# Patient Record
Sex: FEMALE | Race: WHITE | Employment: OTHER | URBAN - METROPOLITAN AREA
[De-identification: names, ages, dates, MRNs, and addresses within clinical notes are randomized per-mention and may not be internally consistent; named-entity substitution may affect disease eponyms.]

---

## 2017-09-14 ENCOUNTER — DOCUMENTATION ONLY (OUTPATIENT)
Dept: LAB | Facility: CLINIC | Age: 67
End: 2017-09-14

## 2017-09-14 DIAGNOSIS — E78.5 HYPERLIPIDEMIA WITH TARGET LDL LESS THAN 160: Primary | ICD-10-CM

## 2017-09-14 DIAGNOSIS — Z13.1 SCREENING FOR DIABETES MELLITUS: ICD-10-CM

## 2017-09-14 NOTE — PROGRESS NOTES
This patient has a future lab only appointment on 10/12/2017 and needs orders. Please sign the pended labs taken from the overdue  and make any needed changes. Thanks chela

## 2017-10-12 DIAGNOSIS — E78.5 HYPERLIPIDEMIA WITH TARGET LDL LESS THAN 160: ICD-10-CM

## 2017-10-12 DIAGNOSIS — Z13.1 SCREENING FOR DIABETES MELLITUS: ICD-10-CM

## 2017-10-12 LAB
CHOLEST SERPL-MCNC: 163 MG/DL
GLUCOSE SERPL-MCNC: 87 MG/DL (ref 70–99)
HDLC SERPL-MCNC: 69 MG/DL
LDLC SERPL CALC-MCNC: 75 MG/DL
NONHDLC SERPL-MCNC: 94 MG/DL
TRIGL SERPL-MCNC: 93 MG/DL

## 2017-10-12 PROCEDURE — 82947 ASSAY GLUCOSE BLOOD QUANT: CPT | Performed by: FAMILY MEDICINE

## 2017-10-12 PROCEDURE — 80061 LIPID PANEL: CPT | Performed by: FAMILY MEDICINE

## 2017-10-12 PROCEDURE — 36415 COLL VENOUS BLD VENIPUNCTURE: CPT | Performed by: FAMILY MEDICINE

## 2017-10-16 ENCOUNTER — OFFICE VISIT (OUTPATIENT)
Dept: FAMILY MEDICINE | Facility: CLINIC | Age: 67
End: 2017-10-16
Payer: COMMERCIAL

## 2017-10-16 ENCOUNTER — RADIANT APPOINTMENT (OUTPATIENT)
Dept: GENERAL RADIOLOGY | Facility: CLINIC | Age: 67
End: 2017-10-16
Attending: FAMILY MEDICINE
Payer: COMMERCIAL

## 2017-10-16 VITALS
DIASTOLIC BLOOD PRESSURE: 66 MMHG | TEMPERATURE: 97 F | SYSTOLIC BLOOD PRESSURE: 109 MMHG | HEIGHT: 65 IN | BODY MASS INDEX: 22.37 KG/M2 | WEIGHT: 134.25 LBS | HEART RATE: 92 BPM

## 2017-10-16 DIAGNOSIS — E78.5 HYPERLIPIDEMIA WITH TARGET LDL LESS THAN 160: ICD-10-CM

## 2017-10-16 DIAGNOSIS — Z12.31 VISIT FOR SCREENING MAMMOGRAM: ICD-10-CM

## 2017-10-16 DIAGNOSIS — M25.541 METACARPOPHALANGEAL JOINT PAIN OF RIGHT HAND: ICD-10-CM

## 2017-10-16 DIAGNOSIS — Z23 NEED FOR PROPHYLACTIC VACCINATION AND INOCULATION AGAINST INFLUENZA: ICD-10-CM

## 2017-10-16 DIAGNOSIS — Z00.00 ENCOUNTER FOR PREVENTATIVE ADULT HEALTH CARE EXAMINATION: Primary | ICD-10-CM

## 2017-10-16 DIAGNOSIS — Z23 ENCOUNTER FOR IMMUNIZATION: ICD-10-CM

## 2017-10-16 PROCEDURE — 99213 OFFICE O/P EST LOW 20 MIN: CPT | Mod: 25 | Performed by: FAMILY MEDICINE

## 2017-10-16 PROCEDURE — G0009 ADMIN PNEUMOCOCCAL VACCINE: HCPCS | Performed by: FAMILY MEDICINE

## 2017-10-16 PROCEDURE — 99397 PER PM REEVAL EST PAT 65+ YR: CPT | Mod: 25 | Performed by: FAMILY MEDICINE

## 2017-10-16 PROCEDURE — G0008 ADMIN INFLUENZA VIRUS VAC: HCPCS | Performed by: FAMILY MEDICINE

## 2017-10-16 PROCEDURE — 90670 PCV13 VACCINE IM: CPT | Performed by: FAMILY MEDICINE

## 2017-10-16 PROCEDURE — 73130 X-RAY EXAM OF HAND: CPT | Mod: RT

## 2017-10-16 PROCEDURE — 90662 IIV NO PRSV INCREASED AG IM: CPT | Performed by: FAMILY MEDICINE

## 2017-10-16 RX ORDER — ATORVASTATIN CALCIUM 40 MG/1
40 TABLET, FILM COATED ORAL DAILY
Qty: 90 TABLET | Refills: 3 | Status: SHIPPED | OUTPATIENT
Start: 2017-10-16 | End: 2018-10-17

## 2017-10-16 NOTE — PROGRESS NOTES
SUBJECTIVE:   CC: Herminia Nowak is an 67 year old woman who presents for preventive health visit.     Physical   Annual:     Getting at least 3 servings of Calcium per day::  Yes    Bi-annual eye exam::  Yes    Dental care twice a year::  Yes    Sleep apnea or symptoms of sleep apnea::  Daytime drowsiness    Diet::  Regular (no restrictions)    Frequency of exercise::  4-5 days/week    Taking medications regularly::  Yes    Medication side effects::  None    Additional concerns today::  YES      Sister-diagnosed with breast cancer 2017.  Negative for BRCA gene.    Father  last year.  No further palpitations since her dad .    Right thumb pain and stiffness.  Harder to open jars.          Today's PHQ-2 Score:   PHQ-2 (  [employer]) 10/12/2017   Q1: Little interest or pleasure in doing things 0   Q2: Feeling down, depressed or hopeless 0   PHQ-2 Score 0   Q1: Little interest or pleasure in doing things Not at all   Q2: Feeling down, depressed or hopeless Not at all   PHQ-2 Score 0       Abuse: Current or Past(Physical, Sexual or Emotional)- No  Do you feel safe in your environment - Yes    Social History   Substance Use Topics     Smoking status: Never Smoker     Smokeless tobacco: Never Used     Alcohol use Yes      Comment: Occasional glass of wine     The patient does not drink >3 drinks per day nor >7 drinks per week.    Reviewed orders with patient.  Reviewed health maintenance and updated orders accordingly - Yes  Labs reviewed in [facility]    Patient over age 50, mutual decision to screen reflected in health maintenance.      Pertinent mammograms are reviewed under the imaging tab.  History of abnormal Pap smear: NO - age 67 - see link Cervical Cytology Screening Guidelines      Reviewed and updated as needed this visit by clinical staff  Tobacco  Allergies  Med Hx  Surg Hx  Fam Hx  Soc Hx      Reviewed and updated as needed this visit by Provider        Past Medical History:   Diagnosis Date      "Constipation      Cyst of breast     pt unsure which breast was over 20 years ago     Hemorrhoid      Hyperlipidemia LDL goal < 160      Non-rheumatic mitral regurgitation 10/31/2016     WBC decreased 1/3/2014      Past Surgical History:   Procedure Laterality Date     COLONOSCOPY  2015?    Normal     GYN SURGERY  Approx 1989    Laparoscopy. All ok     PUNC/ASPIR BREAST CYST      pt unsure which breast over 20 years ago     SURGICAL HISTORY OF -       laparoscopy       ROS:  C: NEGATIVE for fever, chills, change in weight  I: NEGATIVE for worrisome rashes, moles or lesions  E: NEGATIVE for vision changes or irritation  ENT: NEGATIVE for ear, mouth and throat problems  R: NEGATIVE for significant cough or SOB  B: NEGATIVE for masses, tenderness or discharge  CV: NEGATIVE for chest pain, palpitations or peripheral edema  GI: NEGATIVE for nausea, abdominal pain, heartburn, or change in bowel habits  : NEGATIVE for unusual urinary or vaginal symptoms. No vaginal bleeding.  M: NEGATIVE for significant arthralgias or myalgia  N: NEGATIVE for weakness, dizziness or paresthesias  P: NEGATIVE for changes in mood or affect      OBJECTIVE:   /66  Pulse 92  Temp 97  F (36.1  C) (Oral)  Ht 5' 5\" (1.651 m)  Wt 134 lb 4 oz (60.9 kg)  BMI 22.34 kg/m2  EXAM:  GENERAL: healthy, alert and no distress  EYES: Eyes grossly normal to inspection, PERRL and conjunctivae and sclerae normal  HENT: ear canals and TM's normal, nose and mouth without ulcers or lesions  NECK: no adenopathy, no asymmetry, masses, or scars and thyroid normal to palpation  RESP: lungs clear to auscultation - no rales, rhonchi or wheezes  BREAST: normal without masses, tenderness or nipple discharge and no palpable axillary masses or adenopathy  CV: regular rate and rhythm, normal S1 S2, no S3 or S4, no murmur, click or rub, no peripheral edema and peripheral pulses strong  ABDOMEN: soft, nontender, no hepatosplenomegaly, no masses and bowel sounds " normal  MS: pain at right 1st MCP joint with mild swelling. No erythema.  no gross musculoskeletal defects noted, no edema  SKIN: no suspicious lesions or rashes  NEURO: Normal strength and tone, mentation intact and speech normal  PSYCH: mentation appears normal, affect normal/bright    ASSESSMENT/PLAN:   1. Encounter for preventative adult health care examination      2. Hyperlipidemia with target LDL less than 160  Chronic, stable, well controlled, continue current medication, refill done if needed    - atorvastatin (LIPITOR) 40 MG tablet; Take 1 tablet (40 mg) by mouth daily  Dispense: 90 tablet; Refill: 3    3. Metacarpophalangeal joint pain of right hand  Consider hand therapy  - XR Hand Right G/E 3 Views; Future  - [facility] PT, HAND, AND CHIROPRACTIC REFERRAL    4. Need for prophylactic vaccination and inoculation against influenza    - FLU VACCINE, INCREASED ANTIGEN, PRESV FREE, AGE 65+ [48993]  - Vaccine Administration, Initial [79963]    5. Encounter for immunization    - PNEUMOCOCCAL CONJ VACCINE 13 VALENT IM    6. Visit for screening mammogram    - *MA Screening Digital Bilateral; Future    COUNSELING:  Reviewed preventive health counseling, as reflected in patient instructions       Regular exercise       Healthy diet/nutrition       Vision screening       Hearing screening       Immunizations    Vaccinated for: Influenza and Pneumococcal           Aspirin Prophylaxsis       Osteoporosis Prevention/Bone Health       Colon cancer screening       (Jacque)menopause management       The 10-year ASCVD risk score ([place] ROSALINO Jr, et al., 2013) is: 3.3%    Values used to calculate the score:      Age: 67 years      Sex: Female      Is Non- : No      Diabetic: No      Tobacco smoker: No      Systolic Blood Pressure: 109 mmHg      Is BP treated: No      HDL Cholesterol: 69 mg/dL      Total Cholesterol: 163 mg/dL       Advance Care Planning           reports that she has never smoked. She has never  "used smokeless tobacco.    Estimated body mass index is 22.34 kg/(m^2) as calculated from the following:    Height as of this encounter: 5' 5\" (1.651 m).    Weight as of this encounter: 134 lb 4 oz (60.9 kg).   Weight management plan: Discussed healthy diet and exercise guidelines and patient will follow up in 12 months in clinic to re-evaluate.    Counseling Resources:  ATP IV Guidelines  Pooled Cohorts Equation Calculator  Breast Cancer Risk Calculator  FRAX Risk Assessment  ICSI Preventive Guidelines  Dietary Guidelines for Americans, 2010  USDA's MyPlate  ASA Prophylaxis  Lung CA Screening    Dee Mccormack MD  [facility]  Injectable Influenza Immunization Documentation    1.  Is the person to be vaccinated sick today?   No    2. Does the person to be vaccinated have an allergy to a component   of the vaccine?   No    3. Has the person to be vaccinated ever had a serious reaction   to influenza vaccine in the past?   No    4. Has the person to be vaccinated ever had Guillain-Barré syndrome?   No    Form completed by self          "

## 2017-10-16 NOTE — NURSING NOTE
Prior to injection verified patient identity using patient's name and date of birth.    Screening Questionnaire for Adult Immunization    Are you sick today?   No   Do you have allergies to medications, food, a vaccine component or latex?   Yes   Have you ever had a serious reaction after receiving a vaccination?   No   Do you have a long-term health problem with heart disease, lung disease, asthma, kidney disease, metabolic disease (e.g. diabetes), anemia, or other blood disorder?   No   Do you have cancer, leukemia, HIV/AIDS, or any other immune system problem?   No   In the past 3 months, have you taken medications that affect  your immune system, such as prednisone, other steroids, or anticancer drugs; drugs for the treatment of rheumatoid arthritis, Crohn s disease, or psoriasis; or have you had radiation treatments?   No   Have you had a seizure, or a brain or other nervous system problem?   No   During the past year, have you received a transfusion of blood or blood     products, or been given immune (gamma) globulin or antiviral drug?   No   For women: Are you pregnant or is there a chance you could become        pregnant during the next month?   No   Have you received any vaccinations in the past 4 weeks?   No     Immunization questionnaire was positive for at least one answer.  Notified Dr. Mccormack.        Per orders of Dr. Mccormack, injection of PCV 13 and flu given by Marilee Angeles. Patient instructed to remain in clinic for 15 minutes afterwards, and to report any adverse reaction to me immediately.       Screening performed by Marilee Angeles on 10/16/2017 at 2:00 PM.

## 2017-10-16 NOTE — MR AVS SNAPSHOT
After Visit Summary   10/16/2017    Herminia Nowak    MRN: [id number]           Patient Information     Date Of Birth          1950        Visit Information        Provider Department      10/16/2017 1:00 PM Dee Mccormack MD [facility]        Today's Diagnoses     Encounter for preventative adult health care examination    -  1    Hyperlipidemia with target LDL less than 160        Metacarpophalangeal joint pain of right hand        Need for prophylactic vaccination and inoculation against influenza        Encounter for immunization        Visit for screening mammogram          Care Instructions      Preventive Health Recommendations  Female Ages 65 +    Yearly exam:     See your health care provider every year in order to  o Review health changes.   o Discuss preventive care.    o Review your medicines if your doctor has prescribed any.      You no longer need a yearly Pap test unless you've had an abnormal Pap test in the past 10 years. If you have vaginal symptoms, such as bleeding or discharge, be sure to talk with your provider about a Pap test.      Every 1 to 2 years, have a mammogram.  If you are over 69, talk with your health care provider about whether or not you want to continue having screening mammograms.      Every 10 years, have a colonoscopy. Or, have a yearly FIT test (stool test). These exams will check for colon cancer.       Have a cholesterol test every 5 years, or more often if your doctor advises it.       Have a diabetes test (fasting glucose) every three years. If you are at risk for diabetes, you should have this test more often.       At age 65, have a bone density scan (DEXA) to check for osteoporosis (brittle bone disease).    Shots:    Get a flu shot each year.    Get a tetanus shot every 10 years.    Talk to your doctor about your pneumonia vaccines. There are now two you should receive - Pneumovax (PPSV 23) and Prevnar (PCV 13).    Talk to  your doctor about the shingles vaccine.    Talk to your doctor about the hepatitis B vaccine.    Nutrition:     Eat at least 5 servings of fruits and vegetables each day.      Eat whole-grain bread, whole-wheat pasta and brown rice instead of white grains and rice.      Talk to your provider about Calcium and Vitamin D.     Lifestyle    Exercise at least 150 minutes a week (30 minutes a day, 5 days a week). This will help you control your weight and prevent disease.      Limit alcohol to one drink per day.      No smoking.       Wear sunscreen to prevent skin cancer.       See your dentist twice a year for an exam and cleaning.      See your eye doctor every 1 to 2 years to screen for conditions such as glaucoma, macular degeneration, cataracts, etc           Follow-ups after your visit        Additional Services     [facility] PT, HAND, AND CHIROPRACTIC REFERRAL       **This order will print in the [facility] Scheduling Office**    Physical Therapy, Hand Therapy and Chiropractic Care are available through:    *[facility]  *[facility]  *[facility]    Call one number to schedule at any of the above locations: [phone].    Your provider has referred you to: Hand Therapy    Indication/Reason for Referral: right 1st MCP joint pain  Onset of Illness: 6 months  Therapy Orders: Evaluate and Treat  Special Programs: None  Special Request: None    Flor Ray      Additional Comments for the Therapist or Chiropractor:     Please be aware that coverage of these services is subject to the terms and limitations of your health insurance plan.  Call member services at your health plan with any benefit or coverage questions.      Please bring the following to your appointment:    *Your personal calendar for scheduling future appointments  *Comfortable clothing                  Future tests that were ordered for you today     Open Future Orders        Priority Expected Expires  "Ordered    XR Hand Right G/E 3 Views Routine 10/16/2017 10/16/2018 10/16/2017    *MA Screening Digital Bilateral Routine  10/16/2018 10/16/2017            Who to contact     If you have questions or need follow up information about today's clinic visit or your schedule please contact [facility] directly at [phone].  Normal or non-critical lab and imaging results will be communicated to you by [employer], letter or phone within 4 business days after the clinic has received the results. If you do not hear from us within 7 days, please contact the clinic through [employer] or phone. If you have a critical or abnormal lab result, we will notify you by phone as soon as possible.  Submit refill requests through [employer] or call your pharmacy and they will forward the refill request to us. Please allow 3 business days for your refill to be completed.          Additional Information About Your Visit        [employer] Information     [employer] gives you secure access to your electronic health record. If you see a primary care provider, you can also send messages to your care team and make appointments. If you have questions, please call your primary care clinic.  If you do not have a primary care provider, please call [phone] and they will assist you.        Care EveryWhere ID     This is your Care EveryWhere ID. This could be used by other organizations to access your [facility] medical records  [id number]        Your Vitals Were     Pulse Temperature Height BMI (Body Mass Index)          92 97  F (36.1  C) (Oral) 5' 5\" (1.651 m) 22.34 kg/m2         Blood Pressure from Last 3 Encounters:   10/16/17 109/66   10/05/16 108/68   08/31/15 104/70    Weight from Last 3 Encounters:   10/16/17 134 lb 4 oz (60.9 kg)   10/05/16 132 lb (59.9 kg)   08/31/15 132 lb 12.8 oz (60.2 kg)              We Performed the Following     FLU VACCINE, INCREASED ANTIGEN, PRESV FREE, AGE 65+ [47817]     [facility] PT, HAND, AND CHIROPRACTIC " REFERRAL     PNEUMOCOCCAL CONJ VACCINE 13 VALENT IM     Vaccine Administration, Initial [42977]          Where to get your medicines      These medications were sent to [facility] [address] [place] [address] [address] [facility]  [address], [facility] [address]     Phone:  [phone]     atorvastatin 40 MG tablet          Primary Care Provider Office Phone # Fax #    Dee Mccormack [phone]       [address] [facility] [address]        Equal Access to Services     HELLEN MA : Hadii aad ku hadasho Soomaali, waaxda luqadaha, qaybta kaalmada adeegyada, waxay idiin hayaan adeeg kharakizzy harris . So [facility] [phone].    ATENCIÓN: Si habla español, tiene a tamez disposición servicios gratuitos de asistencia lingüística. Llame al [phone].    We comply with applicable federal civil rights laws and [place] laws. We do not discriminate on the basis of race, color, national origin, age, disability, sex, sexual orientation, or gender identity.            Thank you!     Thank you for choosing [facility]  for your care. Our goal is always to provide you with excellent care. Hearing back from our patients is one way we can continue to improve our services. Please take a few minutes to complete the written survey that you may receive in the mail after your visit with us. Thank you!             Your Updated Medication List - Protect others around you: Learn how to safely use, store and throw away your medicines at www.disposemymeds.org.          This list is accurate as of: 10/16/17  1:51 PM.  Always use your most recent med list.                   Brand Name Dispense Instructions for use Diagnosis    aspirin 81 MG tablet      Take  by mouth daily. 1 tablet at night        atorvastatin 40 MG tablet    LIPITOR    90 tablet    Take 1 tablet (40 mg) by mouth daily    Hyperlipidemia with target LDL less than 160       CALCIUM 500 PO      Take  by mouth.         Garlic 1000 MG Caps      Take  by mouth.        ibuprofen 200 MG tablet    ADVIL/MOTRIN     Take 200 mg by mouth. As needed        magnesium 250 MG tablet      Take 1 tablet by mouth daily.        MULTIVITAMIN PO      Take  by mouth.        STOOL SOFTENER PO      Take  by mouth.        vitamin D 2000 UNITS tablet      Take 1 tablet by mouth daily.

## 2017-10-16 NOTE — NURSING NOTE
"Chief Complaint   Patient presents with     Physical       Initial /66  Pulse 92  Temp 97  F (36.1  C) (Oral)  Ht 5' 5\" (1.651 m)  Wt 134 lb 4 oz (60.9 kg)  BMI 22.34 kg/m2 Estimated body mass index is 22.34 kg/(m^2) as calculated from the following:    Height as of this encounter: 5' 5\" (1.651 m).    Weight as of this encounter: 134 lb 4 oz (60.9 kg).  Medication Reconciliation: complete   Margaret Walker CMA      "

## 2017-10-26 ENCOUNTER — RADIANT APPOINTMENT (OUTPATIENT)
Dept: MAMMOGRAPHY | Facility: CLINIC | Age: 67
End: 2017-10-26
Attending: FAMILY MEDICINE

## 2017-10-26 DIAGNOSIS — Z12.31 VISIT FOR SCREENING MAMMOGRAM: ICD-10-CM

## 2017-10-30 ENCOUNTER — TRANSFERRED RECORDS (OUTPATIENT)
Dept: HEALTH INFORMATION MANAGEMENT | Facility: CLINIC | Age: 67
End: 2017-10-30

## 2017-10-30 ENCOUNTER — THERAPY VISIT (OUTPATIENT)
Dept: OCCUPATIONAL THERAPY | Facility: CLINIC | Age: 67
End: 2017-10-30
Payer: MEDICARE

## 2017-10-30 DIAGNOSIS — M79.644 THUMB PAIN, RIGHT: Primary | ICD-10-CM

## 2017-10-30 DIAGNOSIS — M18.11 ARTHRITIS OF CARPOMETACARPAL (CMC) JOINT OF RIGHT THUMB: ICD-10-CM

## 2017-10-30 PROCEDURE — 97535 SELF CARE MNGMENT TRAINING: CPT | Mod: GO | Performed by: OCCUPATIONAL THERAPIST

## 2017-10-30 PROCEDURE — 97110 THERAPEUTIC EXERCISES: CPT | Mod: GO | Performed by: OCCUPATIONAL THERAPIST

## 2017-10-30 PROCEDURE — 97165 OT EVAL LOW COMPLEX 30 MIN: CPT | Mod: GO | Performed by: OCCUPATIONAL THERAPIST

## 2017-10-30 PROCEDURE — G8984 CARRY CURRENT STATUS: HCPCS | Mod: GO | Performed by: OCCUPATIONAL THERAPIST

## 2017-10-30 PROCEDURE — G8985 CARRY GOAL STATUS: HCPCS | Mod: GO | Performed by: OCCUPATIONAL THERAPIST

## 2017-10-30 NOTE — MR AVS SNAPSHOT
After Visit Summary   10/30/2017    Herminia Nowak    MRN: [id number]           Patient Information     Date Of Birth          1950        Visit Information        Provider Department      10/30/2017 1:00 PM Mariaa Castro [facility] [facility]        Today's Diagnoses     Thumb pain, right    -  1    Arthritis of carpometacarpal (CMC) joint of right thumb           Follow-ups after your visit        Who to contact     If you have questions or need follow up information about today's clinic visit or your schedule please contact [facility] directly at [phone].  Normal or non-critical lab and imaging results will be communicated to you by [employer], letter or phone within 4 business days after the clinic has received the results. If you do not hear from us within 7 days, please contact the clinic through [employer] or phone. If you have a critical or abnormal lab result, we will notify you by phone as soon as possible.  Submit refill requests through [employer] or call your pharmacy and they will forward the refill request to us. Please allow 3 business days for your refill to be completed.          Additional Information About Your Visit        MyChart Information     [employer] gives you secure access to your electronic health record. If you see a primary care provider, you can also send messages to your care team and make appointments. If you have questions, please call your primary care clinic.  If you do not have a primary care provider, please call [phone] and they will assist you.        Care EveryWhere ID     This is your Care EveryWhere ID. This could be used by other organizations to access your [place] medical records  [id number]         Blood Pressure from Last 3 Encounters:   10/16/17 109/66   10/05/16 108/68   08/31/15 104/70    Weight from Last 3 Encounters:   10/16/17 60.9 kg (134 lb 4 oz)   10/05/16 59.9 kg  (132 lb)   08/31/15 60.2 kg (132 lb 12.8 oz)              We Performed the Following     HC OT EVAL, LOW COMPLEXITY     [facility] CERT REPORT     [facility] INITIAL EVAL REPORT     SELF CARE MNGMENT TRAINING     THERAPEUTIC EXERCISES        Primary Care Provider Office Phone # Fax #    Dee Mccormack [phone]       [address] [facility] [address]        Equal Access to Services     NITISH [facility] Hospital[facility] : Hadii aad ku hadasho Soomaali, waaxda luqadaha, qaybta kaalmada adeegyada, waxay idiin hayaan adeeg kharash la'aan ah. So [facility] [place] [phone].    ATENCIÓN: Si kathleen buchanan, tiene a tamez disposición servicios gratuitos de asistencia lingüística. Llame al [phone].    We comply with applicable federal civil rights laws and [place] laws. We do not discriminate on the basis of race, color, national origin, age, disability, sex, sexual orientation, or gender identity.            Thank you!     Thank you for choosing [facility]  for your care. Our goal is always to provide you with excellent care. Hearing back from our patients is one way we can continue to improve our services. Please take a few minutes to complete the written survey that you may receive in the mail after your visit with us. Thank you!             Your Updated Medication List - Protect others around you: Learn how to safely use, store and throw away your medicines at www.disposemymeds.org.          This list is accurate as of: 10/30/17  2:17 PM.  Always use your most recent med list.                   Brand Name Dispense Instructions for use Diagnosis    aspirin 81 MG tablet      Take  by mouth daily. 1 tablet at night        atorvastatin 40 MG tablet    LIPITOR    90 tablet    Take 1 tablet (40 mg) by mouth daily    Hyperlipidemia with target LDL less than 160       CALCIUM 500 PO      Take  by mouth.        Garlic 1000 MG Caps      Take  by mouth.        ibuprofen 200 MG tablet     ADVIL/MOTRIN     Take 200 mg by mouth. As needed        magnesium 250 MG tablet      Take 1 tablet by mouth daily.        MULTIVITAMIN PO      Take  by mouth.        STOOL SOFTENER PO      Take  by mouth.        vitamin D 2000 UNITS tablet      Take 1 tablet by mouth daily.

## 2017-10-30 NOTE — PROGRESS NOTES
Hand Therapy Initial Evaluation    Current Date:  10/30/2017    Subjective:  Herminia Nowak is a 67 year old left hand dominant female.    Diagnosis:   Right thumb pain  DOI:  10/16/17 (therapy referral)     Patient reports symptoms of pain and weakness/loss of strength of the right thumb which occurred due to gradual onset over the past 6 months. Since onset symptoms are gradually getting worse.  Special tests:  x-ray mild to moderate CMC arthritis.  Previous treatment: None but seen in 2013 for right thumb and wrist pain.  General health as reported by patient is good to excellent.  Pertinent medical history includes:none  Medical allergies: Penicillins.  Surgical history: other: breast biopsy.  Medication history: Cholesterol.    Occupational Profile Information:  Current occupation is Retired  Prior functional level:  independent-shared household chores  Barriers include:none  Mobility: No difficulty  Transportation: drives    Functional Outcome Measure:  Upper Extremity Functional Index  SCORE:   Column Totals: 74/80  (A lower score indicates greater disability.)    ROM:  Thumb  10/30/17   AROM(PROM) Left Right   PAbd 45 45   RAbd 50 40     Strength:   (Measured in pounds)    10/30/17   Trials Left Right   1 53 53-     Lat Pinch  10/30/17   Trials Left Right   1 13 11+ slight     3 Pt Pinch  10/30/17   Trials Left Right   1 13 11+     Tenderness:   10/30/17   CMC joint of Thumb +   FCR -   1st DC -   Thumb web space +     Appearance:   10/30/17   Shoulder deformity at CMC +   Edema over the CMC joint -   Noted collapse of MP into hyperextension during pinch -     Special Tests:   10/30/17   Grind -   Passive Retroposition ++   Finkelsteins -     Pain Report:  VAS(0-10) 10/30/17   At Rest: 0/10   With Use: 3/10   Location:  thumb  Pain Quality:  Dull aching  Frequency: intermittent    Pain is worst:  daytime  Exacerbated by:  Gripping, lifting  Relieved by:  rest  Progression:  Gradually  worsening  Assessment:  Patient presents with symptoms consistent with diagnosis of CMC joint pain of thumb, with conservative intervention.    Patient s limitations or Problem List includes: Pain, Decreased ROM/motion, weakness, decreased stability of the CMC joint, decreased  and pinch strength of the thumb which interferes with patients ability to perform  Self Care Tasks (eating), Work Tasks and Household Chores as compared to previous level of function.    Patient will benefit from skilled Occupational Therapy to increase ROM, flexibility, pinch strength, and stability of the thumb and decrease pain to return to previous activity level and resume normal daily tasks and to reach their rehab potential.    Rehab Potential:  Good - Return to full activity, some limitations    Barriers to Learning:  No barrier    Communication Issues:  Patient appears to be able to clearly communicate and understand verbal and written communication and follow directions correctly.    Chart Review: Chart Review and Simple history review with patient    Identified Performance Deficits: home establishment and management and meal preparation and cleanup    Assessment of Occupational Performance:  3-5 Performance Deficits    Clinical Decision Making (Complexity): Low complexity    Treatment Explanation:  The following has been discussed with the patient:  RX ordered/plan of care  Anticipated outcomes  Possible risks and side effects    Plan:  Frequency:  2 X a month, once daily  Duration:  for 2 months    Treatment Plan:  Modalities:  Paraffin  Therapeutic Exercise: AROM, Isometrics, and Stabilization exercises of the Thumb CMC, including active and resisted abduction, 1st DI strengthening  Manual Techniques: Joint Mobilization or reseating of the trapezium, self MFR to thumb adductor with clip or small ball  Orthotic Fabrication:  Hand based Thumb Spica orthosis, Custom neoprene orthosis  Education: Anatomy of CMC, joint protection  principles, adaptive equipment as needed    Discharge Plan:  Achieve all LTG  [place] in home treatment program.  Reach maximal therapeutic benefit.    Home Program:  Warmth  1st web release with clip or small ball  Self CMC mobilization on chest  Place and hold pinch; avoid MP and IP hyperext  Thumb Stabilization Program with hard  C  and index abd  Incorporate joint protection into daily functional activities    Next Visit:  See in 2 weeks  Consider trial of Paraffin  Assess response to HEP  Consider CMC neoprene cool comfort orthosis or orthoplast thumb spica orthosis   Issue resources for adaptive equipment

## 2017-10-30 NOTE — LETTER
DEPARTMENT OF HEALTH AND HUMAN SERVICES  CENTERS FOR MEDICARE & MEDICAID SERVICES    PLAN/UPDATED PLAN OF PROGRESS FOR OUTPATIENT REHABILITATION    PATIENTS NAME:  Herminia Nowak YOB: 1950  PROVIDER NUMBER:  [id number]  [id number]  PROVIDER NAME: [facility] [place]  MEDICAL RECORD NUMBER: [id number]   START OF CARE DATE:    SOC Date: 10/30/17   TYPE:  OT  PRIMARY/TREATMENT DIAGNOSIS: (Pertinent Medical Diagnosis)  Thumb pain, right  Arthritis of carpometacarpal (CMC) joint of right thumb  VISITS FROM START OF CARE:  Rxs Used: 1     Hand Therapy Initial Evaluation  Current Date:  10/30/2017    Subjective:  Herminia Nowak is a 67 year old left hand dominant female.  Diagnosis:   Right thumb pain  DOI:  10/16/17 (therapy referral)   Patient reports symptoms of pain and weakness/loss of strength of the right thumb which occurred due to gradual onset over the past 6 months. Since onset symptoms are gradually getting worse.  Special tests:  x-ray mild to moderate CMC arthritis.  Previous treatment: None but seen in  for right thumb and wrist pain.  General health as reported by patient is good to excellent.  Pertinent medical history includes:none  Medical allergies: Penicillins.  Surgical history: other: breast biopsy.  Medication history: Cholesterol.    Occupational Profile Information:  Current occupation is Retired  Prior functional level:  independent-shared household chores  Barriers include:none  Mobility: No difficulty  Transportation: drives    Functional Outcome Measure:  Upper Extremity Functional Index  SCORE:   Column Totals: 74/80  (A lower score indicates greater disability.)    ROM:  Thumb  10/30/17   AROM(PROM) Left Right   PAbd 45 45   RAbd 50 40     Strength:   (Measured in pounds)    10/30/17   Trials Left Right   1 53 53-   PATIENTS NAME:  Herminia Nowak   : 1950  Lat Pinch  10/30/17   Trials Left Right   1 13 11+ slight     3 Pt Pinch   10/30/17   Trials Left Right   1 13 11+     Tenderness:   10/30/17   CMC joint of Thumb +   FCR -   1st DC -   Thumb web space +     Appearance:   10/30/17   Shoulder deformity at CMC +   Edema over the CMC joint -   Noted collapse of MP into hyperextension during pinch -     Special Tests:   10/30/17   Grind -   Passive Retroposition ++   Finkelsteins -     Pain Report:  VAS(0-10) 10/30/17   At Rest: 0/10   With Use: 3/10   Location:  thumb  Pain Quality:  Dull aching  Frequency: intermittent    Pain is worst:  daytime  Exacerbated by:  Gripping, lifting  Relieved by:  rest  Progression:  Gradually worsening  Assessment:  Patient presents with symptoms consistent with diagnosis of CMC joint pain of thumb, with conservative intervention.  Patient s limitations or Problem List includes: Pain, Decreased ROM/motion, weakness, decreased stability of the CMC joint, decreased  and pinch strength of the thumb which interferes with patients ability to perform  Self Care Tasks (eating), Work Tasks and Household Chores as compared to previous level of function.  Patient will benefit from skilled Occupational Therapy to increase ROM, flexibility, pinch strength, and stability of the thumb and decrease pain to return to previous activity level and resume normal daily tasks and to reach their rehab potential.  Rehab Potential:  Good - Return to full activity, some limitations  Barriers to Learning:  No barrier    PATIENTS NAME:  Herminia Nowak   : 1950  Communication Issues:  Patient appears to be able to clearly communicate and understand verbal and written communication and follow directions correctly.  Chart Review: Chart Review and Simple history review with patient  Identified Performance Deficits: home establishment and management and meal preparation and cleanup    Assessment of Occupational Performance:  3-5 Performance Deficits  Clinical Decision Making (Complexity): Low complexity  Treatment Explanation:  The  "following has been discussed with the patient:  RX ordered/plan of care  Anticipated outcomes  Possible risks and side effects  Plan:  Frequency:  2 X a month, once daily  Duration:  for 2 months  Treatment Plan:  Modalities:  Paraffin  Therapeutic Exercise: AROM, Isometrics, and Stabilization exercises of the Thumb CMC, including active and resisted abduction, 1st DI strengthening  Manual Techniques: Joint Mobilization or reseating of the trapezium, self MFR to thumb adductor with clip or small ball  Orthotic Fabrication:  Hand based Thumb Spica orthosis, Custom neoprene orthosis  Education: Anatomy of CMC, joint protection principles, adaptive equipment as needed  Discharge Plan:  Achieve all LTG  [place] in home treatment program.  Reach maximal therapeutic benefit.  Home Program:  Warmth  1st web release with clip or small ball  Self CMC mobilization on chest  Place and hold pinch; avoid MP and IP hyperext  Thumb Stabilization Program with hard  C  and index abd  Incorporate joint protection into daily functional activities  Next Visit:  See in 2 weeks  Consider trial of Paraffin  Assess response to HEP  Consider CMC neoprene cool comfort orthosis or orthoplast thumb spica orthosis   Issue resources for adaptive equipment        Caregiver Signature/Credentials ______________________________ Date ________       Treating Provider: Mariaa Castro, MARCUSR/L, CHT    I have reviewed and certified the need for these services and plan of treatment while under my care.        PHYSICIAN'S SIGNATURE:   _________________________________________  Date___________    Dee Mccormack  Certification period: Beginning of Cert date period: 10/30/17 End of Cert period date: 18   PATIENTS NAME:  Herminia Nowak   : 1950  Functional Level Progress Report: Please see attached \"Goal Flow sheet for Functional level.\"    ___X_____ Continue Services or       ________ DC Services                Service dates: SOC Date: 10/30/17 "  to present

## 2017-11-03 ENCOUNTER — TELEPHONE (OUTPATIENT)
Dept: FAMILY MEDICINE | Facility: CLINIC | Age: 67
End: 2017-11-03

## 2017-11-03 NOTE — TELEPHONE ENCOUNTER
Forms received from [facility]/ Updated plan of progress for Hand Therapy Initial Evaluation (current date 10/30/2017)  for Dee Mccormack MD.  Forms placed in provider 'sign me' folder.  Please fax forms to [phone] after completion.    Key Schwarz  Patient Representative

## 2018-01-04 PROBLEM — M79.644 THUMB PAIN, RIGHT: Status: RESOLVED | Noted: 2017-10-30 | Resolved: 2018-01-04

## 2018-01-04 PROBLEM — M18.11 ARTHRITIS OF CARPOMETACARPAL (CMC) JOINT OF RIGHT THUMB: Status: RESOLVED | Noted: 2017-10-30 | Resolved: 2018-01-04

## 2018-07-27 ENCOUNTER — TELEPHONE (OUTPATIENT)
Dept: FAMILY MEDICINE | Facility: CLINIC | Age: 68
End: 2018-07-27

## 2018-07-27 DIAGNOSIS — E78.00 HIGH BLOOD CHOLESTEROL: Primary | ICD-10-CM

## 2018-07-27 NOTE — TELEPHONE ENCOUNTER
Reason for Call:  Other     Detailed comments: Can you please place orders for lab appointment scheduled in October     Phone Number Patient can be reached at: Home number on file [phone] (home)    Best Time:     Can we leave a detailed message on this number? Not Applicable    Call taken on 7/27/2018 at 11:08 AM by Suzette Rodrigues

## 2018-07-27 NOTE — TELEPHONE ENCOUNTER
Ordered lipids per HM. Reviewed last year's labs and OV note. Called patient and she confirms and is willing to have another draw if new provider wants to order anything new. She denies symptoms or concerns with glucose.   Deann Hayden RN

## 2018-10-10 DIAGNOSIS — E78.00 HIGH BLOOD CHOLESTEROL: ICD-10-CM

## 2018-10-10 LAB
CHOLEST SERPL-MCNC: 161 MG/DL
HDLC SERPL-MCNC: 54 MG/DL
LDLC SERPL CALC-MCNC: 86 MG/DL
NONHDLC SERPL-MCNC: 107 MG/DL
TRIGL SERPL-MCNC: 107 MG/DL

## 2018-10-10 PROCEDURE — 80061 LIPID PANEL: CPT | Performed by: NURSE PRACTITIONER

## 2018-10-10 PROCEDURE — 36415 COLL VENOUS BLD VENIPUNCTURE: CPT | Performed by: NURSE PRACTITIONER

## 2018-10-14 ASSESSMENT — ENCOUNTER SYMPTOMS
SORE THROAT: 0
WEAKNESS: 0
BREAST MASS: 0
COUGH: 1
DYSURIA: 0
MYALGIAS: 0
HEMATOCHEZIA: 0
NERVOUS/ANXIOUS: 1
PALPITATIONS: 0
NAUSEA: 0
ABDOMINAL PAIN: 0
CHILLS: 0
DIZZINESS: 0
JOINT SWELLING: 0
FREQUENCY: 0
EYE PAIN: 0
SHORTNESS OF BREATH: 0
CONSTIPATION: 1
ARTHRALGIAS: 1
HEARTBURN: 0
DIARRHEA: 0
HEMATURIA: 0
PARESTHESIAS: 0
FEVER: 0
HEADACHES: 0

## 2018-10-14 ASSESSMENT — ACTIVITIES OF DAILY LIVING (ADL)
CURRENT_FUNCTION: NO ASSISTANCE NEEDED
I_NEED_ASSISTANCE_FOR_THE_FOLLOWING_DAILY_ACTIVITIES:: NO ASSISTANCE IS NEEDED

## 2018-10-17 ENCOUNTER — OFFICE VISIT (OUTPATIENT)
Dept: FAMILY MEDICINE | Facility: CLINIC | Age: 68
End: 2018-10-17
Payer: COMMERCIAL

## 2018-10-17 VITALS
HEART RATE: 66 BPM | DIASTOLIC BLOOD PRESSURE: 68 MMHG | HEIGHT: 65 IN | WEIGHT: 134 LBS | SYSTOLIC BLOOD PRESSURE: 108 MMHG | TEMPERATURE: 98.8 F | BODY MASS INDEX: 22.33 KG/M2

## 2018-10-17 DIAGNOSIS — Z12.31 VISIT FOR SCREENING MAMMOGRAM: ICD-10-CM

## 2018-10-17 DIAGNOSIS — N90.89 LESION OF VULVA: ICD-10-CM

## 2018-10-17 DIAGNOSIS — Z85.828 HISTORY OF SKIN CANCER: ICD-10-CM

## 2018-10-17 DIAGNOSIS — Z12.4 SCREENING FOR MALIGNANT NEOPLASM OF CERVIX: ICD-10-CM

## 2018-10-17 DIAGNOSIS — Z00.00 ROUTINE HISTORY AND PHYSICAL EXAMINATION OF ADULT: Primary | ICD-10-CM

## 2018-10-17 DIAGNOSIS — Z23 NEED FOR PROPHYLACTIC VACCINATION WITH TETANUS-DIPHTHERIA (TD): ICD-10-CM

## 2018-10-17 DIAGNOSIS — E78.5 HYPERLIPIDEMIA WITH TARGET LDL LESS THAN 160: ICD-10-CM

## 2018-10-17 PROCEDURE — G0476 HPV COMBO ASSAY CA SCREEN: HCPCS | Performed by: NURSE PRACTITIONER

## 2018-10-17 PROCEDURE — G0009 ADMIN PNEUMOCOCCAL VACCINE: HCPCS | Performed by: NURSE PRACTITIONER

## 2018-10-17 PROCEDURE — G0145 SCR C/V CYTO,THINLAYER,RESCR: HCPCS | Performed by: NURSE PRACTITIONER

## 2018-10-17 PROCEDURE — 90714 TD VACC NO PRESV 7 YRS+ IM: CPT | Performed by: NURSE PRACTITIONER

## 2018-10-17 PROCEDURE — 90732 PPSV23 VACC 2 YRS+ SUBQ/IM: CPT | Performed by: NURSE PRACTITIONER

## 2018-10-17 PROCEDURE — 90472 IMMUNIZATION ADMIN EACH ADD: CPT | Performed by: NURSE PRACTITIONER

## 2018-10-17 PROCEDURE — 99397 PER PM REEVAL EST PAT 65+ YR: CPT | Mod: 25 | Performed by: NURSE PRACTITIONER

## 2018-10-17 RX ORDER — ATORVASTATIN CALCIUM 40 MG/1
40 TABLET, FILM COATED ORAL DAILY
Qty: 90 TABLET | Refills: 3 | Status: SHIPPED | OUTPATIENT
Start: 2018-10-17 | End: 2019-10-18

## 2018-10-17 ASSESSMENT — ENCOUNTER SYMPTOMS
COUGH: 1
JOINT SWELLING: 0
HEADACHES: 0
MYALGIAS: 0
DYSURIA: 0
NERVOUS/ANXIOUS: 1
FEVER: 0
SHORTNESS OF BREATH: 0
HEARTBURN: 0
SORE THROAT: 0
CONSTIPATION: 1
PARESTHESIAS: 0
NAUSEA: 0
WEAKNESS: 0
HEMATOCHEZIA: 0
CHILLS: 0
HEMATURIA: 0
FREQUENCY: 0
DIARRHEA: 0
PALPITATIONS: 0
ARTHRALGIAS: 1
EYE PAIN: 0
ABDOMINAL PAIN: 0
DIZZINESS: 0
BREAST MASS: 0

## 2018-10-17 ASSESSMENT — ACTIVITIES OF DAILY LIVING (ADL): CURRENT_FUNCTION: NO ASSISTANCE NEEDED

## 2018-10-17 NOTE — MR AVS SNAPSHOT
After Visit Summary   10/17/2018    Herminia Nowak    MRN: [id number]           Patient Information     Date Of Birth          1950        Visit Information        Provider Department      10/17/2018 2:40 PM Jennifer Dalton NP [facility]        Today's Diagnoses     Routine history and physical examination of adult    -  1    Visit for screening mammogram        Need for prophylactic vaccination with tetanus-diphtheria (Td)        Hyperlipidemia with target LDL less than 160        Screening for malignant neoplasm of cervix        Lesion of vulva          Care Instructions      Preventive Health Recommendations    Female Ages 65 +    Yearly exam:     See your health care provider every year in order to  o Review health changes.   o Discuss preventive care.    o Review your medicines if your doctor has prescribed any.      You no longer need a yearly Pap test unless you've had an abnormal Pap test in the past 10 years. If you have vaginal symptoms, such as bleeding or discharge, be sure to talk with your provider about a Pap test.      Every 1 to 2 years, have a mammogram.  If you are over 69, talk with your health care provider about whether or not you want to continue having screening mammograms.      Every 10 years, have a colonoscopy. Or, have a yearly FIT test (stool test). These exams will check for colon cancer.       Have a cholesterol test every 5 years, or more often if your doctor advises it.       Have a diabetes test (fasting glucose) every three years. If you are at risk for diabetes, you should have this test more often.       At age 65, have a bone density scan (DEXA) to check for osteoporosis (brittle bone disease).    Shots:    Get a flu shot each year.    Get a tetanus shot every 10 years.    Talk to your doctor about your pneumonia vaccines. There are now two you should receive - Pneumovax (PPSV 23) and Prevnar (PCV 13).    Talk to your pharmacist about the  shingles vaccine.    Talk to your doctor about the hepatitis B vaccine.    Nutrition:     Eat at least 5 servings of fruits and vegetables each day.      Eat whole-grain bread, whole-wheat pasta and brown rice instead of white grains and rice.      Get adequate Calcium and Vitamin D.     Lifestyle    Exercise at least 150 minutes a week (30 minutes a day, 5 days a week). This will help you control your weight and prevent disease.      Limit alcohol to one drink per day.      No smoking.       Wear sunscreen to prevent skin cancer.       See your dentist twice a year for an exam and cleaning.      See your eye doctor every 1 to 2 years to screen for conditions such as glaucoma, macular degeneration and cataracts.  [facility]   Discharged by : Emily Black CMA  If you have any questions regarding your visit please contact your care team:     Team Gold                Clinic Hours Telephone Number     Dr. Kae Dalton, CNP  Linda Phelan, CNP 7am-7pm  Monday - Thursday   7am-5pm  Fridays  [phone]   (Appointment scheduling available 24/7)     RN Line  [phone] option 2     Urgent Care - [place] and [place] - 11am-9pm Monday-Friday Saturday-Sunday- 9am-5pm     [place] -   5pm-9pm Monday-Friday Saturday-Sunday- 9am-5pm    [phone] - [place]    [phone] - [place]       For a Price Quote for your services, please call our Consumer Price Line at [phone].     What options do I have for visits at the clinic other than the traditional office visit?     To expand how we care for you, many of our providers are utilizing electronic visits (e-visits) and telephone visits, when medically appropriate, for interactions with their patients rather than a visit in the clinic. We also offer nurse visits for many medical concerns. Just like any other service, we will bill your insurance company for  this type of visit based on time spent on the phone with your provider. Not all insurance companies cover these visits. Please check with your medical insurance if this type of visit is covered. You will be responsible for any charges that are not paid by your insurance.   E-visits via In2Gameshart: generally incur a $35.00 fee.     Telephone visits:  Time spent on the phone: *charged based on time that is spent on the phone in increments of 10 minutes. Estimated cost:   5-10 mins $30.00   11-20 mins. $59.00   21-30 mins. $85.00       Use musiXmatch (secure email communication and access to your chart) to send your primary care provider a message or make an appointment. Ask someone on your Team how to sign up for musiXmatch.     As always, Thank you for trusting us with your health care needs!      [facility] [place] [facility]:    Scheduling Appointments  Patricia Chin [facility]  Call: [phone]    [facility]  Call: [phone]    [facility]  Call: [phone]    For Gastroenterology referrals   [facility]   Clinics and Surgery Center, 4th Floor   [address] [place] [address]   Appointments: [phone]    WHERE TO GO FOR CARE?  Clinic    Make an appointment if you:     Are sick (cold, cough, flu, sore throat, earache or in pain).     Have a small injury (sprain, small cut, burn or broken bone).     Need a physical exam, Pap smear, vaccine or prescription refill.     Have questions about your health or medicines.    To reach us:    Call [phone] ([phone]). Open 24 hours every day. (For counseling services, call [phone].)  Log into musiXmatch at [email or web address]. (Visit [email or web address] to create an account.) Hospital emergency room    An emergency is a serious or life- threatening problem that must be treated right away.    Call 293 or get to the hospital if you have:    Very bad or sudden:            -  Chest pain or pressure         - Bleeding         - Head or belly pain         - Dizziness or trouble seeing, walking or                          Speaking    Problems breathing    Blood in your vomit or you are coughing up blood    A major injury (knocked out, loss of a finger or limb, rape, broken bone protruding from skin)  A mental health crisis. (Or call the Mental Health Crisis line at 1-516.756.8596 or Suicide Prevention Hotline at 1-758.445.7805.)    Open 24 hours every day. You don't need an appointment.     Urgent care    Visit urgent care for sickness or small injuries when the clinic is closed. You don't need an appointment. To check hours or find an urgent care near you, visit [email or web address]. Online care    Get online care from [facility] for more than 70 common problems, like colds, allergies and infections. Open 24 hours every day at:   [email or web address]   Need help deciding?    For advice about where to be seen, you may call your clinic and ask to speak with a nurse. We're here for you 24 hours every day.         If you are deaf or hard of hearing, please let us know. We provide many free services including sign language interpreters, oral interpreters, TTYs, telephone amplifiers, note takers and written materials.                       Follow-ups after your visit        Additional Services     OB/GYN REFERRAL       Your provider has referred you to:  [facility] [phone]   [email or web address]    Please be aware that coverage of these services is subject to the terms and limitations of your health insurance plan.  Call member services at your health plan with any benefit or coverage questions.      Please bring the following with you to your appointment:    (1) Any X-Rays, CTs or MRIs which have been performed.  Contact the facility where they were done to arrange for  prior to your scheduled appointment.   (2) List of current  medications   (3) This referral request   (4) Any documents/labs given to you for this referral                  Follow-up notes from your care team     Return in about 1 year (around 10/17/2019) for Physical Exam.      Your next 10 appointments already scheduled     Oct 29, 2018 10:30 AM CDT   MA SCREENING BILATERAL W/ JANUARY with [id number]   [facility] ([facility])    [address] [place] [address], 2nd Floor  [facility] [address] [phone]           How do I prepare for my exam? (Food and drink instructions) No Food and Drink Restrictions.  How do I prepare for my exam? (Other instructions) Do not use any powder, lotion or deodorant under your arms or on your breast. If you do, we will ask you to remove it before your exam.  What should I wear: Wear comfortable, two-piece clothing.  How long does the exam take: Most scans will take 15 minutes.  What should I bring: Bring any previous mammograms from other facilities or have them mailed to the breast center.  Do I need a :  No  is needed.  What do I need to tell my doctor: If you have any allergies, tell your care team.  What should I do after the exam: No restrictions, You may resume normal activities.  What is this test: This test is an x-ray of the breast to look for breast disease. The breast is pressed between two plates to flatten and spread the tissue. An X-ray is taken of the breast from different angles.  Who should I call with questions: If you have any questions, please call the Imaging Department where you will have your exam. Directions, parking instructions, and other information is available on our website, [email or web address].  Other information about my exam Three-dimensional (3D) mammograms are available at [facility] locations in [facility], [facility], [facility], [place], and [place]. [facility] locations include [place] and the [facility] [place] in  "[place].  Benefits of 3D mammograms include: * Improved rate of cancer detection * Decreases your chance of having to go back for more tests, which means fewer: * \"False-positive\" results (This means that there is an abnormal area but it isn't cancer.) * Invasive testing procedures, such as a biopsy or surgery * Can provide clearer images of the breast if you have dense breast tissue.  *3D mammography is an optional exam that anyone can have with a 2D mammogram. It doesn't replace or take the place of a 2D mammogram. 2D mammograms remain an effective screening test for all women.  Not all insurance companies cover the cost of a 3D mammogram. Check with your insurance.              Who to contact     If you have questions or need follow up information about today's clinic visit or your schedule please contact [facility] directly at [phone].  Normal or non-critical lab and imaging results will be communicated to you by MyChart, letter or phone within 4 business days after the clinic has received the results. If you do not hear from us within 7 days, please contact the clinic through Solidagext or phone. If you have a critical or abnormal lab result, we will notify you by phone as soon as possible.  Submit refill requests through backstitch or call your pharmacy and they will forward the refill request to us. Please allow 3 business days for your refill to be completed.          Additional Information About Your Visit        Crowdrallyhart Information     backstitch gives you secure access to your electronic health record. If you see a primary care provider, you can also send messages to your care team and make appointments. If you have questions, please call your primary care clinic.  If you do not have a primary care provider, please call [phone] and they will assist you.        Care EveryWhere ID     This is your Care EveryWhere ID. This could be used by other organizations to access your [place] " "medical records  [id number]        Your Vitals Were     Pulse Temperature Height BMI (Body Mass Index)          66 98.8  F (37.1  C) (Oral) 5' 5\" (1.651 m) 22.3 kg/m2         Blood Pressure from Last 3 Encounters:   10/17/18 108/68   10/16/17 109/66   10/05/16 108/68    Weight from Last 3 Encounters:   10/17/18 134 lb (60.8 kg)   10/16/17 134 lb 4 oz (60.9 kg)   10/05/16 132 lb (59.9 kg)              We Performed the Following     HPV High Risk Types DNA Cervical     OB/GYN REFERRAL     Pap imaged thin layer screen with HPV - recommended age 30 - 65 years (select HPV order below)     PPSV23, IM/SUBQ (2+ YRS) - Gijuenhkj43     TD (ADULT, 7+) PRESERVE FREE          Today's Medication Changes          These changes are accurate as of 10/17/18  3:38 PM.  If you have any questions, ask your nurse or doctor.               Stop taking these medicines if you haven't already. Please contact your care team if you have questions.     vitamin D 2000 units tablet   Stopped by:  Jennifer Dalton NP                Where to get your medicines      These medications were sent to [facility] [address] [place] [address]     Phone:  [phone]     atorvastatin 40 MG tablet                Primary Care Provider Office Phone # Fax #    Jennifer Dalton [phone]       [address] [place] [address]        Equal Access to Services     NITISH MA AH: Hadii aad ku hadasho Soomaali, waaxda luqadaha, qaybta kaalmada adeegyada, alison pugh. So [facility] [phone].    ATENCIÓN: Si habla español, tiene a tamez disposición servicios gratuitos de asistencia lingüística. Llame al [phone].    We comply with applicable federal civil rights laws and [place] laws. We do not discriminate on the basis of race, color, national origin, age, disability, sex, sexual orientation, or gender identity.            Thank you!     " Thank you for choosing [facility]  for your care. Our goal is always to provide you with excellent care. Hearing back from our patients is one way we can continue to improve our services. Please take a few minutes to complete the written survey that you may receive in the mail after your visit with us. Thank you!             Your Updated Medication List - Protect others around you: Learn how to safely use, store and throw away your medicines at www.disposemymeds.org.          This list is accurate as of 10/17/18  3:38 PM.  Always use your most recent med list.                   Brand Name Dispense Instructions for use Diagnosis    aspirin 81 MG tablet      Take  by mouth daily. 1 tablet at night        atorvastatin 40 MG tablet    LIPITOR    90 tablet    Take 1 tablet (40 mg) by mouth daily    Hyperlipidemia with target LDL less than 160       CALCIUM 500 PO      Take  by mouth.        FISH OIL PO           Garlic 1000 MG Caps      Take  by mouth.        GLUCOSAMINE CHONDROITIN ADV PO           ibuprofen 200 MG tablet    ADVIL/MOTRIN     Take 200 mg by mouth. As needed        magnesium 250 MG tablet      Take 1 tablet by mouth daily.        MIRALAX PO      Take by mouth as needed        MULTIVITAMIN PO      Take  by mouth.        STOOL SOFTENER PO      Take  by mouth.

## 2018-10-17 NOTE — PATIENT INSTRUCTIONS
Preventive Health Recommendations    Female Ages 65 +    Yearly exam:     See your health care provider every year in order to  o Review health changes.   o Discuss preventive care.    o Review your medicines if your doctor has prescribed any.      You no longer need a yearly Pap test unless you've had an abnormal Pap test in the past 10 years. If you have vaginal symptoms, such as bleeding or discharge, be sure to talk with your provider about a Pap test.      Every 1 to 2 years, have a mammogram.  If you are over 69, talk with your health care provider about whether or not you want to continue having screening mammograms.      Every 10 years, have a colonoscopy. Or, have a yearly FIT test (stool test). These exams will check for colon cancer.       Have a cholesterol test every 5 years, or more often if your doctor advises it.       Have a diabetes test (fasting glucose) every three years. If you are at risk for diabetes, you should have this test more often.       At age 65, have a bone density scan (DEXA) to check for osteoporosis (brittle bone disease).    Shots:    Get a flu shot each year.    Get a tetanus shot every 10 years.    Talk to your doctor about your pneumonia vaccines. There are now two you should receive - Pneumovax (PPSV 23) and Prevnar (PCV 13).    Talk to your pharmacist about the shingles vaccine.    Talk to your doctor about the hepatitis B vaccine.    Nutrition:     Eat at least 5 servings of fruits and vegetables each day.      Eat whole-grain bread, whole-wheat pasta and brown rice instead of white grains and rice.      Get adequate Calcium and Vitamin D.     Lifestyle    Exercise at least 150 minutes a week (30 minutes a day, 5 days a week). This will help you control your weight and prevent disease.      Limit alcohol to one drink per day.      No smoking.       Wear sunscreen to prevent skin cancer.       See your dentist twice a year for an exam and cleaning.      See your eye doctor  every 1 to 2 years to screen for conditions such as glaucoma, macular degeneration and cataracts.  [facility]   Discharged by : Emily Black CMA  If you have any questions regarding your visit please contact your care team:     Team Gold                Clinic Hours Telephone Number     Dr. Kae Dalton, CNP  Linda Tapan, CNP 7am-7pm  Monday - Thursday   7am-5pm  Fridays  [phone]   (Appointment scheduling available 24/7)     RN Line  [phone] option 2     Urgent Care - [place] - 11am-9pm Monday-Friday Saturday-Sunday- 9am-5pm     [place] -   5pm-9pm Monday-Friday Saturday-Sunday- 9am-5pm    [phone] - [place]    [phone] - [place]       For a Price Quote for your services, please call our Consumer Price Line at [phone].     What options do I have for visits at the clinic other than the traditional office visit?     To expand how we care for you, many of our providers are utilizing electronic visits (e-visits) and telephone visits, when medically appropriate, for interactions with their patients rather than a visit in the clinic. We also offer nurse visits for many medical concerns. Just like any other service, we will bill your insurance company for this type of visit based on time spent on the phone with your provider. Not all insurance companies cover these visits. Please check with your medical insurance if this type of visit is covered. You will be responsible for any charges that are not paid by your insurance.   E-visits via Nopsec: generally incur a $35.00 fee.     Telephone visits:  Time spent on the phone: *charged based on time that is spent on the phone in increments of 10 minutes. Estimated cost:   5-10 mins $30.00   11-20 mins. $59.00   21-30 mins. $85.00       Use Nopsec (secure email communication and access to your chart) to send your primary  care provider a message or make an appointment. Ask someone on your Team how to sign up for Lineagen.     As always, Thank you for trusting us with your health care needs!      [place]:    Scheduling Appointments  [place]  Call: [phone]    [facility]osalia Mejias [facility]  Call: [phone]    [facility]  Call: [phone]    For Gastroenterology referrals   [facility]   Clinics and Surgery [place], 4th Floor   [address] [place] [address]   Appointments: [phone]    WHERE TO GO FOR CARE?  Clinic    Make an appointment if you:     Are sick (cold, cough, flu, sore throat, earache or in pain).     Have a small injury (sprain, small cut, burn or broken bone).     Need a physical exam, Pap smear, vaccine or prescription refill.     Have questions about your health or medicines.    To reach us:    Call [phone] ([phone]). Open 24 hours every day. (For counseling services, call [phone].)  Log into Lineagen at [email or web address]. (Visit [email or web address] to create an account.) Hospital emergency room    An emergency is a serious or life- threatening problem that must be treated right away.    Call 014 or get to the hospital if you have:    Very bad or sudden:            - Chest pain or pressure         - Bleeding         - Head or belly pain         - Dizziness or trouble seeing, walking or                          Speaking    Problems breathing    Blood in your vomit or you are coughing up blood    A major injury (knocked out, loss of a finger or limb, rape, broken bone protruding from skin)  A mental health crisis. (Or call the Mental Health Crisis line at 1-550.613.3733 or Suicide Prevention Hotline at 1-448.768.6654.)    Open 24 hours every day. You don't need an appointment.     Urgent care    Visit urgent care for sickness or small injuries when the clinic is closed. You don't  need an appointment. To check hours or find an urgent care near you, visit [email or web address]. Online care    Get [facility] care from [facility] for more than 70 common problems, like colds, allergies and infections. Open 24 hours every day at:   [email or web address]   Need help deciding?    For advice about where to be seen, you may call your clinic and ask to speak with a nurse. We're here for you 24 hours every day.         If you are deaf or hard of hearing, please let us know. We provide many free services including sign language interpreters, oral interpreters, TTYs, telephone amplifiers, note takers and written materials.

## 2018-10-17 NOTE — PROGRESS NOTES
SUBJECTIVE:   Herminia Nowak is a 68 year old female who presents for Preventive Visit.    Are you in the first 12 months of your Medicare coverage?  No    Patient would like pimple in vaginal area checked.   Has questions about Shingles vaccine, informed her she should  Check with pharmacy for insurance coverage.   Informed patient she is due for next pneumonia vaccine and TDAP, she is undecided on getting today. Getting over a cold.   She was also undecided on Flu Vaccine today.     Constipation:  Needing to use over the counter medications that are effective.  Docusate calcium 5-6 times per week  Generic Miralax 1/3-1/2 dosage daily    Vaginal lesion:  Has a spot she can feel on the right posterior labia.  It is not painful but she is worried about it because she has history of skin cancer.  She first noticed it in the last year.  She has a hard time seeing it when trying to use a mirror.    Physical   Annual:     Getting at least 3 servings of Calcium per day:  NO    Bi-annual eye exam:  Yes    Dental care twice a year:  Yes    Sleep apnea or symptoms of sleep apnea:  None    Diet:  Regular (no restrictions)    Frequency of exercise:  4-5 days/week    Duration of exercise:  30-45 minutes    Taking medications regularly:  Yes    Medication side effects:  None    Additional concerns today:  YES    Ability to successfully perform activities of daily living: no assistance needed    Home Safety:  Throw rugs in the hallway    Hearing Impairment: no hearing concerns     Fall risk:  Fallen 2 or more times in the past year?: No  Any fall with injury in the past year?: No    COGNITIVE SCREEN  1) Repeat 3 items (Leader, Season, Table)    2) Clock draw: NORMAL  3) 3 item recall: Recalls 2 objects  Results: NORMAL clock, 1-2 items recalled: COGNITIVE IMPAIRMENT LESS LIKELY    Mini-CogTM Copyright RICHI Resendez. Licensed by the author for use in [facility]; reprinted with permission (beatriz@.[facility]). All rights reserved.         Reviewed and updated as needed this visit by clinical staff  Tobacco  Allergies  Meds  Problems  Med Hx  Surg Hx  Fam Hx  Soc Hx          Reviewed and updated as needed this visit by Provider  Allergies  Meds  Problems        Social History   Substance Use Topics     Smoking status: Never Smoker     Smokeless tobacco: Never Used     Alcohol use Yes      Comment: Occasional glass of wine       Alcohol Use 10/14/2018   If you drink alcohol do you typically have greater than 3 drinks per day OR greater than 7 drinks per week? No   No flowsheet data found.    Today's PHQ-2 Score:   PHQ-2 ( 1999 [employer]) 10/14/2018   Q1: Little interest or pleasure in doing things 0   Q2: Feeling down, depressed or hopeless 1   PHQ-2 Score 1   Q1: Little interest or pleasure in doing things Not at all   Q2: Feeling down, depressed or hopeless Several days   PHQ-2 Score 1       Do you feel safe in your environment - Yes    Do you have a Health Care Directive?: Yes: Patient states has Advance Directive and will bring in a copy to clinic.    Current providers sharing in care for this patient include:   Patient Care Team:  Jennifer Dalton, NP as PCP - General (Nurse Practitioner - Family)    The following health maintenance items are reviewed in Epic and correct as of today:  Health Maintenance   Topic Date Due     INFLUENZA VACCINE (1) 09/01/2018     PNEUMOCOCCAL (2 of 2 - PPSV23) 10/16/2018     MAMMO Q1 YR  10/26/2018     LIPID MONITORING Q1 YEAR  10/10/2019     FALL RISK ASSESSMENT  10/17/2019     PHQ-2 Q1 YR  10/17/2019     DEXA Q5 YR  10/20/2020     ADVANCE DIRECTIVE PLANNING Q5 YRS  12/16/2021     COLON CANCER SCREEN (SYSTEM ASSIGNED)  06/20/2023     TETANUS IMMUNIZATION (SYSTEM ASSIGNED)  10/17/2028     HEPATITIS C SCREENING  Completed     BP Readings from Last 3 Encounters:   10/17/18 108/68   10/16/17 109/66   10/05/16 108/68    Wt Readings from Last 3 Encounters:   10/17/18 134 lb (60.8 kg)   10/16/17 134 lb 4 oz  (60.9 kg)   10/05/16 132 lb (59.9 kg)                  Patient Active Problem List   Diagnosis     Advance Care Planning     Hyperlipidemia with target LDL less than 160     WBC decreased     Family history of osteoporosis     Non-rheumatic mitral regurgitation     Non-rheumatic tricuspid valve insufficiency     History of skin cancer     Past Surgical History:   Procedure Laterality Date     COLONOSCOPY  2015?    Normal     GYN SURGERY  Approx 1989    Laparoscopy. All ok     PUNC/ASPIR BREAST CYST      pt unsure which breast over 20 years ago     SURGICAL HISTORY OF -       laparoscopy       Social History   Substance Use Topics     Smoking status: Never Smoker     Smokeless tobacco: Never Used     Alcohol use Yes      Comment: Occasional glass of wine     Family History   Problem Relation Age of Onset     Cancer Mother      skin     Cerebrovascular Disease Mother      Several small strokes, also paternal and maternal     Osteoporosis Mother      Osteopenia     Hypertension Father      Paternal aunts/uncles     Hyperlipidemia Father      Paternal aunts and uncles     Osteoporosis Sister      Sister had to give herself shots     Thyroid Disease Sister      Thyroid irradiated- on medication now     Hyperlipidemia Sister      No Known Problems Daughter      Breast Cancer Sister 67     BRCA negative     No Known Problems Daughter      Diabetes Maternal Aunt      Breast Cancer Paternal Aunt      Diabetes Maternal Grandmother      Also materials aunts/uncles     Diabetes Other          Current Outpatient Prescriptions   Medication Sig Dispense Refill     aspirin 81 MG tablet Take  by mouth daily. 1 tablet at night       atorvastatin (LIPITOR) 40 MG tablet Take 1 tablet (40 mg) by mouth daily 90 tablet 3     Calcium Carbonate (CALCIUM 500 PO) Take  by mouth.       Docusate Calcium (STOOL SOFTENER PO) Take  by mouth.       Garlic 1000 MG CAPS Take  by mouth.       ibuprofen (ADVIL,MOTRIN) 200 MG tablet Take 200 mg by  "mouth. As needed       magnesium 250 MG tablet Take 1 tablet by mouth daily.        Misc Natural Products (GLUCOSAMINE CHONDROITIN ADV PO)        Multiple Vitamin (MULTIVITAMIN OR) Take  by mouth.       Omega-3 Fatty Acids (FISH OIL PO)        Polyethylene Glycol 3350 (MIRALAX PO) Take by mouth as needed       [DISCONTINUED] atorvastatin (LIPITOR) 40 MG tablet Take 1 tablet (40 mg) by mouth daily 90 tablet 3     Allergies   Allergen Reactions     Penicillins Rash       Last 3 Pap and HPV Results:   PAP / HPV 8/31/2015 9/24/2012   PAP NIL NIL       Review of Systems   Constitutional: Negative for chills and fever.   HENT: Positive for congestion. Negative for ear pain, hearing loss and sore throat.    Eyes: Positive for visual disturbance. Negative for pain.   Respiratory: Positive for cough. Negative for shortness of breath.    Cardiovascular: Negative for chest pain, palpitations and peripheral edema.   Gastrointestinal: Positive for constipation. Negative for abdominal pain, diarrhea, heartburn, hematochezia and nausea.   Breasts:  Negative for tenderness, breast mass and discharge.   Genitourinary: Positive for genital sores. Negative for dysuria, frequency, hematuria, pelvic pain, urgency, vaginal bleeding and vaginal discharge.   Musculoskeletal: Positive for arthralgias. Negative for joint swelling and myalgias.   Skin: Negative for rash.   Neurological: Negative for dizziness, weakness, headaches and paresthesias.   Psychiatric/Behavioral: Negative for mood changes. The patient is nervous/anxious.        OBJECTIVE:   /68  Pulse 66  Temp 98.8  F (37.1  C) (Oral)  Ht 5' 5\" (1.651 m)  Wt 134 lb (60.8 kg)  BMI 22.3 kg/m2 Estimated body mass index is 22.3 kg/(m^2) as calculated from the following:    Height as of this encounter: 5' 5\" (1.651 m).    Weight as of this encounter: 134 lb (60.8 kg).  Physical Exam  GENERAL: healthy, alert and no distress  EYES: Eyes grossly normal to inspection, PERRL and " conjunctivae and sclerae normal  HENT: ear canals and TM's normal, nose and mouth without ulcers or lesions  NECK: no adenopathy, no asymmetry, masses, or scars and thyroid normal to palpation  RESP: lungs clear to auscultation - no rales, rhonchi or wheezes  BREAST: normal without masses, tenderness or nipple discharge and no palpable axillary masses or adenopathy  CV: regular rate and rhythm, normal S1 S2, no S3 or S4, no murmur, click or rub, no peripheral edema and peripheral pulses strong  ABDOMEN: soft, nontender, no hepatosplenomegaly, no masses and bowel sounds normal   (female): hyperpigmented papule of right inferior vulva.  Vaginal mucosa pink, moist, well rugated, and normal cervix/adnexa/uterus without masses or discharge  MS: no gross musculoskeletal defects noted, no edema  SKIN: no suspicious lesions or rashes  NEURO: Normal strength and tone, mentation intact and speech normal  PSYCH: mentation appears normal, affect normal/bright    Diagnostic Test Results:  Results for orders placed or performed in visit on 10/10/18   Lipid panel reflex to direct LDL Fasting   Result Value Ref Range    Cholesterol 161 <200 mg/dL    Triglycerides 107 <150 mg/dL    HDL Cholesterol 54 >49 mg/dL    LDL Cholesterol Calculated 86 <100 mg/dL    Non HDL Cholesterol 107 <130 mg/dL       ASSESSMENT / PLAN:   1. Routine history and physical examination of adult    - PPSV23, IM/SUBQ (2+ YRS) - [id number]    2. Visit for screening mammogram  - Patient has mammogram scheduled this month.    3. Need for prophylactic vaccination with tetanus-diphtheria (Td)    - TD (ADULT, 7+) PRESERVE FREE    4. Hyperlipidemia with target LDL less than 160  Chronic, stable, continue current treatment.    - atorvastatin (LIPITOR) 40 MG tablet; Take 1 tablet (40 mg) by mouth daily  Dispense: 90 tablet; Refill: 3    5. Screening for malignant neoplasm of cervix  Per guidelines, patient is not due for Pap since she is 68 years old, however  "patient specifically requests Pap today.  She has h/o normal Paps in our record dating back to 2012, so due to lack of a decade of documented normal Paps, doing a Pap today seems reasonable.  - Pap imaged thin layer screen with HPV  - HPV High Risk Types DNA Cervical    6. Lesion of vulva  - Discussed that this appears to be a hyperpigmented skin tag and recommend continuing to monitor.  Patient voices concern given that she has h/o skin cancer (squamous cell), family history of skin cancers (basal cell and squamous cell), and that this is a new finding in the past year.  She is considering going to ob/gyn for a consult and is aware how to schedule.    7. History of skin cancer      End of Life Planning:  Patient currently has an advanced directive: Patient asked to bring in record from home.    COUNSELING:  Reviewed preventive health counseling, as reflected in patient instructions       Regular exercise       Healthy diet/nutrition    BP Readings from Last 1 Encounters:   10/17/18 108/68     Estimated body mass index is 22.3 kg/(m^2) as calculated from the following:    Height as of this encounter: 5' 5\" (1.651 m).    Weight as of this encounter: 134 lb (60.8 kg).           reports that she has never smoked. She has never used smokeless tobacco.      Appropriate preventive services were discussed with this patient, including applicable screening as appropriate for cardiovascular disease, diabetes, osteopenia/osteoporosis, and glaucoma.  As appropriate for age/gender, discussed screening for colorectal cancer, prostate cancer, breast cancer, and cervical cancer. Checklist reviewing preventive services available has been given to the patient.    Reviewed patients plan of care and provided an AVS. The Basic Care Plan (routine screening as documented in Health Maintenance) for Herminia meets the Care Plan requirement. This Care Plan has been established and reviewed with the Patient.    Counseling Resources:  ATP IV " Guidelines  Pooled Cohorts Equation Calculator  Breast Cancer Risk Calculator  FRAX Risk Assessment  ICSI Preventive Guidelines  Dietary Guidelines for Americans, 2010  [employer]'s MyPlate  ASA Prophylaxis  Lung CA Screening    Jennifer Dalton NP  [facility]  Answers for HPI/ROS submitted by the patient on 10/14/2018   PHQ-2 Score: 1creen for

## 2018-10-17 NOTE — NURSING NOTE
Screening Questionnaire for Adult Immunization    Are you sick today?   No   Do you have allergies to medications, food, a vaccine component or latex?   No   Have you ever had a serious reaction after receiving a vaccination?   No   Do you have a long-term health problem with heart disease, lung disease, asthma, kidney disease, metabolic disease (e.g. diabetes), anemia, or other blood disorder?   No   Do you have cancer, leukemia, HIV/AIDS, or any other immune system problem?   No   In the past 3 months, have you taken medications that affect  your immune system, such as prednisone, other steroids, or anticancer drugs; drugs for the treatment of rheumatoid arthritis, Crohn s disease, or psoriasis; or have you had radiation treatments?   No   Have you had a seizure, or a brain or other nervous system problem?   No   During the past year, have you received a transfusion of blood or blood     products, or been given immune (gamma) globulin or antiviral drug?   No   For women: Are you pregnant or is there a chance you could become        pregnant during the next month?   No   Have you received any vaccinations in the past 4 weeks?   No     Immunization questionnaire answers were all negative.        Per orders of  Jennifer RAMIREZ-NP, injection of Td, and PPSV 23 given by Emily Black. Patient instructed to remain in clinic for 15 minutes afterwards, and to report any adverse reaction to me immediately.       Screening performed by Emily Black on 10/17/2018 at 3:53 PM.

## 2018-10-22 LAB
COPATH REPORT: NORMAL
PAP: NORMAL

## 2018-10-23 LAB
FINAL DIAGNOSIS: NORMAL
HPV HR 12 DNA CVX QL NAA+PROBE: NEGATIVE
HPV16 DNA SPEC QL NAA+PROBE: NEGATIVE
HPV18 DNA SPEC QL NAA+PROBE: NEGATIVE
SPECIMEN DESCRIPTION: NORMAL
SPECIMEN SOURCE CVX/VAG CYTO: NORMAL

## 2018-10-29 ENCOUNTER — RADIANT APPOINTMENT (OUTPATIENT)
Dept: MAMMOGRAPHY | Facility: CLINIC | Age: 68
End: 2018-10-29
Attending: NURSE PRACTITIONER
Payer: COMMERCIAL

## 2018-10-29 DIAGNOSIS — Z12.31 VISIT FOR SCREENING MAMMOGRAM: ICD-10-CM

## 2018-11-16 ENCOUNTER — OFFICE VISIT (OUTPATIENT)
Dept: FAMILY MEDICINE | Facility: CLINIC | Age: 68
End: 2018-11-16
Payer: COMMERCIAL

## 2018-11-16 VITALS
BODY MASS INDEX: 22.09 KG/M2 | OXYGEN SATURATION: 98 % | WEIGHT: 132.6 LBS | TEMPERATURE: 98.2 F | DIASTOLIC BLOOD PRESSURE: 60 MMHG | SYSTOLIC BLOOD PRESSURE: 102 MMHG | HEIGHT: 65 IN | HEART RATE: 80 BPM

## 2018-11-16 DIAGNOSIS — J20.9 ACUTE BRONCHITIS WITH SYMPTOMS > 10 DAYS: Primary | ICD-10-CM

## 2018-11-16 PROCEDURE — 99213 OFFICE O/P EST LOW 20 MIN: CPT | Performed by: NURSE PRACTITIONER

## 2018-11-16 RX ORDER — AZITHROMYCIN 250 MG/1
TABLET, FILM COATED ORAL
Qty: 6 TABLET | Refills: 0 | Status: SHIPPED | OUTPATIENT
Start: 2018-11-16 | End: 2019-10-18

## 2018-11-16 RX ORDER — PREDNISONE 20 MG/1
40 TABLET ORAL DAILY
Qty: 10 TABLET | Refills: 0 | Status: SHIPPED | OUTPATIENT
Start: 2018-11-16 | End: 2018-11-21

## 2018-11-16 NOTE — PROGRESS NOTES
SUBJECTIVE:   Herminia Nowak is a 68 year old female who presents to clinic today for the following health issues:      Acute Illness   Acute illness concerns:  Cold symptoms  Onset: 6 weeks    Fever: no    Chills/Sweats: no    Headache (location?): no    Sinus Pressure:no    Conjunctivitis:  no    Ear Pain: no    Rhinorrhea: YES    Congestion: YES    Sore Throat: no      Cough: YES-productive of clear sputum    Wheeze: no    Decreased Appetite: no    Nausea: no    Vomiting: no    Diarrhea:  no    Dysuria/Freq.: no    Fatigue/Achiness: YES- a little    Sick/Strep Exposure: YES-      Therapies Tried and outcome: Mucinex  Cough just is not going away.    Problem list and histories reviewed & adjusted, as indicated.  Additional history: as documented    Patient Active Problem List   Diagnosis     Advance Care Planning     Hyperlipidemia with target LDL less than 160     WBC decreased     Family history of osteoporosis     Non-rheumatic mitral regurgitation     Non-rheumatic tricuspid valve insufficiency     History of skin cancer     Past Surgical History:   Procedure Laterality Date     COLONOSCOPY  2013    Normal     GYN SURGERY  Approx 1989    Laparoscopy. All ok     PUNC/ASPIR BREAST CYST      pt unsure which breast over 20 years ago     SURGICAL HISTORY OF -       laparoscopy       Social History   Substance Use Topics     Smoking status: Never Smoker     Smokeless tobacco: Never Used     Alcohol use Yes      Comment: Occasional glass of wine     Family History   Problem Relation Age of Onset     Cancer Mother      skin     Cerebrovascular Disease Mother      Several small strokes, also paternal and maternal     Osteoporosis Mother      Osteopenia     Hypertension Father      Paternal aunts/uncles     Hyperlipidemia Father      Paternal aunts and uncles     Osteoporosis Sister      Sister had to give herself shots     Thyroid Disease Sister      Thyroid irradiated- on medication now     Hyperlipidemia  "Sister      No Known Problems Daughter      Breast Cancer Sister 67     BRCA negative     No Known Problems Daughter      Diabetes Maternal Aunt      Breast Cancer Paternal Aunt      Diabetes Maternal Grandmother      Also materials aunts/uncles     Diabetes Other          Current Outpatient Prescriptions   Medication Sig Dispense Refill     aspirin 81 MG tablet Take  by mouth daily. 1 tablet at night       atorvastatin (LIPITOR) 40 MG tablet Take 1 tablet (40 mg) by mouth daily 90 tablet 3            Calcium Carbonate (CALCIUM 500 PO) Take  by mouth.       Docusate Calcium (STOOL SOFTENER PO) Take  by mouth.       ibuprofen (ADVIL,MOTRIN) 200 MG tablet Take 200 mg by mouth. As needed       Misc Natural Products (GLUCOSAMINE CHONDROITIN ADV PO)        Multiple Vitamin (MULTIVITAMIN OR) Take  by mouth.       Omega-3 Fatty Acids (FISH OIL PO)        Polyethylene Glycol 3350 (MIRALAX PO) Take by mouth as needed              Allergies   Allergen Reactions     Penicillins Rash     BP Readings from Last 3 Encounters:   11/16/18 102/60   10/17/18 108/68   10/16/17 109/66    Wt Readings from Last 3 Encounters:   11/16/18 132 lb 9.6 oz (60.1 kg)   10/17/18 134 lb (60.8 kg)   10/16/17 134 lb 4 oz (60.9 kg)                    Reviewed and updated as needed this visit by clinical staff  Tobacco  Allergies  Meds  Problems  Med Hx  Surg Hx  Fam Hx  Soc Hx        Reviewed and updated as needed this visit by Provider  Allergies  Meds  Problems         ROS:  Constitutional, HEENT, cardiovascular, pulmonary, gi and gu systems are negative, except as otherwise noted.    OBJECTIVE:     /60 (BP Location: Right arm, Patient Position: Sitting, Cuff Size: Adult Regular)  Pulse 80  Temp 98.2  F (36.8  C) (Oral)  Ht 5' 5\" (1.651 m)  Wt 132 lb 9.6 oz (60.1 kg)  SpO2 98%  BMI 22.07 kg/m2  Body mass index is 22.07 kg/(m^2).  GENERAL: healthy, alert and no distress  EYES: Eyes grossly normal to inspection, PERRL and " conjunctivae and sclerae normal  HENT: ear canals and TM's normal, nose and mouth without ulcers or lesions  NECK: no adenopathy and no asymmetry, masses, or scars  RESP: lungs clear to auscultation - no rales or rhonchi.  Expiratory wheeze and coarseness heard on forced expiration in bilateral posterior lobes.  Intermittent moist cough present.  CV: regular rate and rhythm, normal S1 S2, no S3 or S4, no murmur, click or rub, no peripheral edema and peripheral pulses strong  PSYCH: mentation appears normal, affect normal/bright      ASSESSMENT/PLAN:       1. Acute bronchitis with symptoms > 10 days    - azithromycin (ZITHROMAX) 250 MG tablet; Two tablets first day, then one tablet daily for four days.  Dispense: 6 tablet; Refill: 0  - predniSONE (DELTASONE) 20 MG tablet; Take 2 tablets (40 mg) by mouth daily for 5 days  Dispense: 10 tablet; Refill: 0  - Patient will start with the Prednisone and if not improving in 3-5 days, will also start the antibiotic.  Discussed with patient the indication and use of medication(s), risks/benefits, and potential adverse side effects.  Patient/guardian verbalized understanding and agreement with the plan.  - Push fluids, increase rest.    Return in about 2 weeks (around 11/30/2018) for if symptoms worsen or fail to improve.    Jennifer Dalton NP  [facility]

## 2018-11-16 NOTE — MR AVS SNAPSHOT
After Visit Summary   11/16/2018    Herminia Nowak    MRN: [id number]           Patient Information     Date Of Birth          1950        Visit Information        Provider Department      11/16/2018 8:20 AM Jennifer Dalton NP [facility]        Today's Diagnoses     Acute bronchitis with symptoms > 10 days    -  1      Care Instructions    Start the Prednisone 2 tablet take for 5 days.    If you are not feeling better in 3-5 days, then go ahead and start the antibiotic.      Acute Bronchitis  Your healthcare provider has told you that you have acute bronchitis. Bronchitis is infection or inflammation of the bronchial tubes (airways in the lungs). Normally, air moves easily in and out of the airways. Bronchitis narrows the airways, making it harder for air to flow in and out of the lungs. This causes symptoms such as shortness of breath, coughing up yellow or green mucus, and wheezing. Bronchitis can be acute or chronic. Acute means the condition comes on quickly and goes away in a short time, usually within 3 to 10 days. Chronic means a condition lasts a long time and often comes back.    What causes acute bronchitis?  Acute bronchitis almost always starts as a viral respiratory infection, such as a cold or the flu. Certain factors make it more likely for a cold or flu to turn into bronchitis. These include being very young, being elderly, having a heart or lung problem, or having a weak immune system. Cigarette smoking also makes bronchitis more likely.  When bronchitis develops, the airways become swollen. The airways may also become infected with bacteria. This is known as a secondary infection.  Diagnosing acute bronchitis  Your healthcare provider will examine you and ask about your symptoms and health history. You may also have a sputum culture to test the fluid in your lungs. Chest X-rays may be done to look for infection in the lungs.  Treating acute  bronchitis  Bronchitis usually clears up as the cold or flu goes away. You can help feel better faster by doing the following:    Take medicine as directed. You may be told to take ibuprofen or other over-the-counter medicines. These help relieve inflammation in your bronchial tubes. Your healthcare provider may prescribe an inhaler to help open up the bronchial tubes. Most of the time, acute bronchitis is caused by a viral infection. Antibiotics are usually not prescribed for viral infections.    Drink plenty of fluids, such as water, juice, or warm soup. Fluids loosen mucus so that you can cough it up. This helps you breathe more easily. Fluids also prevent dehydration.    Make sure you get plenty of rest.    Do not smoke. Do not allow anyone else to smoke in your home.  Recovery and follow-up  Follow up with your doctor as you are told. You will likely feel better in a week or two. But a dry cough can linger beyond that time. Let your doctor know if you still have symptoms (other than a dry cough) after 2 weeks, or if you re prone to getting bronchial infections. Take steps to protect yourself from future infections. These steps include stopping smoking and avoiding tobacco smoke, washing your hands often, and getting a yearly flu shot.  When to call your healthcare provider  Call the healthcare provider if you have any of the following:    Fever of 100.4 F (38.0 C) or higher, or as advised    Symptoms that get worse, or new symptoms    Trouble breathing    Symptoms that don t start to improve within a week, or within 3 days of taking antibiotics   Date Last Reviewed: 12/1/2016 2000-2018 [employer]. [address] [place] [address], [place] [address]. All rights reserved. This information is not intended as a substitute for professional medical care. Always follow your healthcare professional's instructions.                Follow-ups after your visit        Follow-up notes from your care team     Return  "in about 2 weeks (around 11/30/2018) for if symptoms worsen or fail to improve.      Who to contact     If you have questions or need follow up information about today's clinic visit or your schedule please contact [employer] directly at [phone].  Normal or non-critical lab and imaging results will be communicated to you by [employer], letter or phone within 4 business days after the clinic has received the results. If you do not hear from us within 7 days, please contact the clinic through [employer] or phone. If you have a critical or abnormal lab result, we will notify you by phone as soon as possible.  Submit refill requests through [employer] or call your pharmacy and they will forward the refill request to us. Please allow 3 business days for your refill to be completed.          Additional Information About Your Visit        [employer] Information     [employer] gives you secure access to your electronic health record. If you see a primary care provider, you can also send messages to your care team and make appointments. If you have questions, please call your primary care clinic.  If you do not have a primary care provider, please call [phone] and they will assist you.        Care EveryWhere ID     This is your Care EveryWhere ID. This could be used by other organizations to access your [place] medical records  [id number]        Your Vitals Were     Pulse Temperature Height Pulse Oximetry BMI (Body Mass Index)       80 98.2  F (36.8  C) (Oral) 5' 5\" (1.651 m) 98% 22.07 kg/m2        Blood Pressure from Last 3 Encounters:   11/16/18 102/60   10/17/18 108/68   10/16/17 109/66    Weight from Last 3 Encounters:   11/16/18 132 lb 9.6 oz (60.1 kg)   10/17/18 134 lb (60.8 kg)   10/16/17 134 lb 4 oz (60.9 kg)              Today, you had the following     No orders found for display         Today's Medication Changes          These changes are accurate as of 11/16/18  8:55 AM.  If you have any " questions, ask your nurse or doctor.               Start taking these medicines.        Dose/Directions    azithromycin 250 MG tablet   Commonly known as:  ZITHROMAX   Used for:  Acute bronchitis with symptoms > 10 days   Started by:  Jennifer Dalton NP        Two tablets first day, then one tablet daily for four days.   Quantity:  6 tablet   Refills:  0       predniSONE 20 MG tablet   Commonly known as:  DELTASONE   Used for:  Acute bronchitis with symptoms > 10 days   Started by:  Jennifer Dalton NP        Dose:  40 mg   Take 2 tablets (40 mg) by mouth daily for 5 days   Quantity:  10 tablet   Refills:  0            Where to get your medicines      [facility] [address] [place] [address]     Phone:  [phone]     azithromycin 250 MG tablet    predniSONE 20 MG tablet                Primary Care Provider Office Phone # Fax #    Jennifer Dalton [phone]       [address] [place] [address]        Equal Access to Services     NITISH MA AH: Hadii aad ku hadasho Soomaali, waaxda luqadaha, qaybta kaalmada adeegyada, waxay idiin hayaan timo harris . So [facility] [phone].    ATENCIÓN: Si habla español, tiene a tamez disposición servicios gratuitos de asistencia lingüística. [facility] [phone].    We comply with applicable federal civil rights laws and [place] laws. We do not discriminate on the basis of race, color, national origin, age, disability, sex, sexual orientation, or gender identity.            Thank you!     Thank you for choosing [facility]  for your care. Our goal is always to provide you with excellent care. Hearing back from our patients is one way we can continue to improve our services. Please take a few minutes to complete the written survey that you may receive in the mail after your visit with us. Thank you!             Your Updated  Medication List - Protect others around you: Learn how to safely use, store and throw away your medicines at www.disposemymeds.org.          This list is accurate as of 11/16/18  8:55 AM.  Always use your most recent med list.                   Brand Name Dispense Instructions for use Diagnosis    aspirin 81 MG tablet      Take  by mouth daily. 1 tablet at night        atorvastatin 40 MG tablet    LIPITOR    90 tablet    Take 1 tablet (40 mg) by mouth daily    Hyperlipidemia with target LDL less than 160       azithromycin 250 MG tablet    ZITHROMAX    6 tablet    Two tablets first day, then one tablet daily for four days.    Acute bronchitis with symptoms > 10 days       CALCIUM 500 PO      Take  by mouth.        FISH OIL PO           GLUCOSAMINE CHONDROITIN ADV PO           ibuprofen 200 MG tablet    ADVIL/MOTRIN     Take 200 mg by mouth. As needed        MIRALAX PO      Take by mouth as needed        MULTIVITAMIN PO      Take  by mouth.        predniSONE 20 MG tablet    DELTASONE    10 tablet    Take 2 tablets (40 mg) by mouth daily for 5 days    Acute bronchitis with symptoms > 10 days       STOOL SOFTENER PO      Take  by mouth.

## 2018-11-16 NOTE — PATIENT INSTRUCTIONS
Start the Prednisone 2 tablet take for 5 days.    If you are not feeling better in 3-5 days, then go ahead and start the antibiotic.      Acute Bronchitis  Your healthcare provider has told you that you have acute bronchitis. Bronchitis is infection or inflammation of the bronchial tubes (airways in the lungs). Normally, air moves easily in and out of the airways. Bronchitis narrows the airways, making it harder for air to flow in and out of the lungs. This causes symptoms such as shortness of breath, coughing up yellow or green mucus, and wheezing. Bronchitis can be acute or chronic. Acute means the condition comes on quickly and goes away in a short time, usually within 3 to 10 days. Chronic means a condition lasts a long time and often comes back.    What causes acute bronchitis?  Acute bronchitis almost always starts as a viral respiratory infection, such as a cold or the flu. Certain factors make it more likely for a cold or flu to turn into bronchitis. These include being very young, being elderly, having a heart or lung problem, or having a weak immune system. Cigarette smoking also makes bronchitis more likely.  When bronchitis develops, the airways become swollen. The airways may also become infected with bacteria. This is known as a secondary infection.  Diagnosing acute bronchitis  Your healthcare provider will examine you and ask about your symptoms and health history. You may also have a sputum culture to test the fluid in your lungs. Chest X-rays may be done to look for infection in the lungs.  Treating acute bronchitis  Bronchitis usually clears up as the cold or flu goes away. You can help feel better faster by doing the following:    Take medicine as directed. You may be told to take ibuprofen or other over-the-counter medicines. These help relieve inflammation in your bronchial tubes. Your healthcare provider may prescribe an inhaler to help open up the bronchial tubes. Most of the time, acute  bronchitis is caused by a viral infection. Antibiotics are usually not prescribed for viral infections.    Drink plenty of fluids, such as water, juice, or warm soup. Fluids loosen mucus so that you can cough it up. This helps you breathe more easily. Fluids also prevent dehydration.    Make sure you get plenty of rest.    Do not smoke. Do not allow anyone else to smoke in your home.  Recovery and follow-up  Follow up with your doctor as you are told. You will likely feel better in a week or two. But a dry cough can linger beyond that time. Let your doctor know if you still have symptoms (other than a dry cough) after 2 weeks, or if you re prone to getting bronchial infections. Take steps to protect yourself from future infections. These steps include stopping smoking and avoiding tobacco smoke, washing your hands often, and getting a yearly flu shot.  When to call your healthcare provider  Call the healthcare provider if you have any of the following:    Fever of 100.4 F (38.0 C) or higher, or as advised    Symptoms that get worse, or new symptoms    Trouble breathing    Symptoms that don t start to improve within a week, or within 3 days of taking antibiotics   Date Last Reviewed: 12/1/2016 2000-2018 [employer]. [address] [place] [address], [place] [address]. All rights reserved. This information is not intended as a substitute for professional medical care. Always follow your healthcare professional's instructions.

## 2019-09-24 ENCOUNTER — DOCUMENTATION ONLY (OUTPATIENT)
Dept: LAB | Facility: CLINIC | Age: 69
End: 2019-09-24

## 2019-09-24 DIAGNOSIS — Z13.220 SCREENING FOR HYPERLIPIDEMIA: Primary | ICD-10-CM

## 2019-09-24 DIAGNOSIS — Z13.1 SCREENING FOR DIABETES MELLITUS: ICD-10-CM

## 2019-09-24 NOTE — PROGRESS NOTES
Pt has lab appointment for upcoming physical in October.  Please place future orders or advise patient.    Thanks,  [facility]

## 2019-10-11 DIAGNOSIS — Z13.220 SCREENING FOR HYPERLIPIDEMIA: ICD-10-CM

## 2019-10-11 DIAGNOSIS — Z13.1 SCREENING FOR DIABETES MELLITUS: ICD-10-CM

## 2019-10-11 LAB
CHOLEST SERPL-MCNC: 174 MG/DL
GLUCOSE SERPL-MCNC: 96 MG/DL (ref 70–99)
HDLC SERPL-MCNC: 64 MG/DL
LDLC SERPL CALC-MCNC: 96 MG/DL
NONHDLC SERPL-MCNC: 110 MG/DL
TRIGL SERPL-MCNC: 72 MG/DL

## 2019-10-11 PROCEDURE — 80061 LIPID PANEL: CPT | Performed by: NURSE PRACTITIONER

## 2019-10-11 PROCEDURE — 82947 ASSAY GLUCOSE BLOOD QUANT: CPT | Performed by: NURSE PRACTITIONER

## 2019-10-11 PROCEDURE — 36415 COLL VENOUS BLD VENIPUNCTURE: CPT | Performed by: NURSE PRACTITIONER

## 2019-10-14 ASSESSMENT — ENCOUNTER SYMPTOMS
COUGH: 0
DYSURIA: 0
HEARTBURN: 0
CONSTIPATION: 1
JOINT SWELLING: 0
PARESTHESIAS: 0
NERVOUS/ANXIOUS: 0
DIARRHEA: 0
DIZZINESS: 0
HEMATOCHEZIA: 0
PALPITATIONS: 0
BREAST MASS: 0
FEVER: 0
SHORTNESS OF BREATH: 0
HEADACHES: 0
ARTHRALGIAS: 0
NAUSEA: 0
EYE PAIN: 0
MYALGIAS: 0
WEAKNESS: 0
ABDOMINAL PAIN: 0
HEMATURIA: 0
FREQUENCY: 0
SORE THROAT: 0
CHILLS: 0

## 2019-10-14 ASSESSMENT — ACTIVITIES OF DAILY LIVING (ADL): CURRENT_FUNCTION: NO ASSISTANCE NEEDED

## 2019-10-18 ENCOUNTER — OFFICE VISIT (OUTPATIENT)
Dept: FAMILY MEDICINE | Facility: CLINIC | Age: 69
End: 2019-10-18
Payer: MEDICARE

## 2019-10-18 VITALS
WEIGHT: 137 LBS | BODY MASS INDEX: 22.02 KG/M2 | HEIGHT: 66 IN | TEMPERATURE: 98 F | DIASTOLIC BLOOD PRESSURE: 82 MMHG | SYSTOLIC BLOOD PRESSURE: 110 MMHG

## 2019-10-18 DIAGNOSIS — Z78.0 ASYMPTOMATIC POSTMENOPAUSAL STATUS: ICD-10-CM

## 2019-10-18 DIAGNOSIS — E78.5 HYPERLIPIDEMIA WITH TARGET LDL LESS THAN 160: ICD-10-CM

## 2019-10-18 DIAGNOSIS — I36.1 NON-RHEUMATIC TRICUSPID VALVE INSUFFICIENCY: ICD-10-CM

## 2019-10-18 DIAGNOSIS — Z80.3 FAMILY HISTORY OF MALIGNANT NEOPLASM OF BREAST: ICD-10-CM

## 2019-10-18 DIAGNOSIS — I34.0 NON-RHEUMATIC MITRAL REGURGITATION: ICD-10-CM

## 2019-10-18 DIAGNOSIS — K59.01 SLOW TRANSIT CONSTIPATION: ICD-10-CM

## 2019-10-18 DIAGNOSIS — Z00.00 ENCOUNTER FOR MEDICARE ANNUAL WELLNESS EXAM: Primary | ICD-10-CM

## 2019-10-18 DIAGNOSIS — Z12.31 ENCOUNTER FOR SCREENING MAMMOGRAM FOR BREAST CANCER: ICD-10-CM

## 2019-10-18 DIAGNOSIS — Z82.62 FAMILY HISTORY OF OSTEOPOROSIS: ICD-10-CM

## 2019-10-18 PROCEDURE — G0438 PPPS, INITIAL VISIT: HCPCS | Performed by: NURSE PRACTITIONER

## 2019-10-18 RX ORDER — BIOTIN 1 MG
1000 TABLET ORAL DAILY
COMMUNITY
End: 2020-10-26

## 2019-10-18 RX ORDER — ATORVASTATIN CALCIUM 40 MG/1
40 TABLET, FILM COATED ORAL DAILY
Qty: 90 TABLET | Refills: 3 | Status: SHIPPED | OUTPATIENT
Start: 2019-10-18 | End: 2020-10-08

## 2019-10-18 ASSESSMENT — ENCOUNTER SYMPTOMS
DIARRHEA: 0
WEAKNESS: 0
FREQUENCY: 0
HEMATOCHEZIA: 0
BREAST MASS: 0
EYE PAIN: 0
CHILLS: 0
SORE THROAT: 0
ARTHRALGIAS: 0
FEVER: 0
JOINT SWELLING: 0
DYSURIA: 0
HEADACHES: 0
ABDOMINAL PAIN: 0
NAUSEA: 0
NERVOUS/ANXIOUS: 0
DIZZINESS: 0
HEMATURIA: 0
SHORTNESS OF BREATH: 0
PALPITATIONS: 0
CONSTIPATION: 1
PARESTHESIAS: 0
COUGH: 0
HEARTBURN: 0
MYALGIAS: 0

## 2019-10-18 ASSESSMENT — MIFFLIN-ST. JEOR: SCORE: 1165.24

## 2019-10-18 ASSESSMENT — ACTIVITIES OF DAILY LIVING (ADL): CURRENT_FUNCTION: NO ASSISTANCE NEEDED

## 2019-10-18 NOTE — PATIENT INSTRUCTIONS
Patient Education   Personalized Prevention Plan  You are due for the preventive services outlined below.  Your care team is available to assist you in scheduling these services.  If you have already completed any of these items, please share that information with your care team to update in your medical record.  Health Maintenance Due   Topic Date Due     Zoster (Shingles) Vaccine (2 of 3) 11/19/2012     Flu Vaccine (1) 09/01/2019     FALL RISK ASSESSMENT  10/17/2019     Annual Wellness Visit  10/17/2019     Mammogram  10/29/2019           For the ultrasound of the heart [facility]: [phone]    [facility]     Discharged by : Jennifer Bello CNP  Paper scripts provided to patient : none     If you have any questions regarding your visit please contact your care team:     Team Echo              Clinic Hours Telephone Number     Dr. Emeka Dalton CNP   7am-7pm  Monday - Thursday   7am-5pm  Fridays  [phone]   (Appointment scheduling available 24/7)     RN Line  [phone] option 2     Urgent [facility] - [facility] - 11am-9pm Monday-Friday Saturday-Sunday- 9am-5pm     Welcome -   5pm-9pm Monday-Friday Saturday-Sunday- 9am-5pm    [phone] - [place]    [phone] - Welcome     For a Price Quote for your services, please call our Consumer Price Line at [phone].     What options do I have for visits at the clinic other than the traditional office visit?     To expand how we care for you, many of our providers are utilizing electronic visits (e-visits) and telephone visits, when medically appropriate, for interactions with their patients rather than a visit in the clinic. We also offer nurse visits for many medical concerns. Just like any other service, we will bill your insurance company for this type of visit based on time spent on the phone with your provider. Not all insurance companies cover these  visits. Please check with your medical insurance if this type of visit is covered. You will be responsible for any charges that are not paid by your insurance.     E-visits via [employer]: generally incur a $45.00 fee.     Telephone visits:  Time spent on the phone: *charged based on time that is spent on the phone in increments of 10 minutes. Estimated cost:   5-10 mins $30.00   11-20 mins. $59.00   21-30 mins. $85.00       Use [employer] (secure email communication and access to your chart) to send your primary care provider a message or make an appointment. Ask someone on your Team how to sign up for [employer]     As always, Thank you for trusting us with your health care needs!      [facility]:    Scheduling Appointments  [place], [facility]  Call: [phone]    Rosalia Mejias [facility]  Call: [phone]    [facility]  Call: [phone]    For Gastroenterology referrals   [facility]   Clinics and Surgery Center, 4th Floor   [address] [place] [address]   Appointments: [phone]    WHERE TO GO FOR CARE?    Clinic    Make an appointment if you:       Are sick (cold, cough, flu, sore throat, earache or in pain).       Have a small injury (sprain, small cut, burn or broken bone).       Need a physical exam, Pap smear, vaccine or prescription refill.       Have questions about your health or medicines.    To reach us:      Call [phone] ([phone]). Open 24 hours every day. (For counseling services, call [phone].)    Log into [employer] at [email or web address]. (Visit [email or web address] to create an account.) Hospital emergency room    An emergency is a serious or life- threatening problem that must be treated right away.    Call 401 or get to the hospital if you have:      Very bad or sudden:            - Chest pain or pressure         - Bleeding         - Head or belly pain         - Dizziness or  trouble seeing, walking or                          Speaking      Problems breathing      Blood in your vomit or you are coughing up blood      A major injury (knocked out, loss of a finger or limb, rape, broken bone protruding from skin)    A mental health crisis. (Or call the Mental Health Crisis line at 1-604.298.9955 or Suicide Prevention Hotline at 1-747.497.9832.)    Open 24 hours every day. You don't need an appointment.     Urgent care    Visit urgent care for sickness or small injuries when the clinic is closed. You don't need an appointment. To check hours or find an urgent care near you, visit [email or web address]. Online care    Get online care from [facility] for more than 70 common problems, like colds, allergies and infections. Open 24 hours every day at:   [email or web address]   Need help deciding?    For advice about where to be seen, you may call your clinic and ask to speak with a nurse. We're here for you 24 hours every day.         If you are deaf or hard of hearing, please let us know. We provide many free services including sign language interpreters, oral interpreters, TTYs, telephone amplifiers, note takers and written materials.

## 2019-10-18 NOTE — PROGRESS NOTES
"SUBJECTIVE:   Herminia Nowak is a 69 year old female who presents for Preventive Visit.    Are you in the first 12 months of your Medicare coverage?  Was there a few weeks ago. Goes in every 6 months.    Healthy Habits:     In general, how would you rate your overall health?  Good    Frequency of exercise:  4-5 days/week    Duration of exercise:  30-45 minutes    Do you usually eat at least 4 servings of fruit and vegetables a day, include whole grains    & fiber and avoid regularly eating high fat or \"junk\" foods?  No    Taking medications regularly:  Yes    Medication side effects:  Not applicable    Ability to successfully perform activities of daily living:  No assistance needed    Home Safety:  No safety concerns identified    Hearing Impairment:  No hearing concerns    In the past 6 months, have you been bothered by leaking of urine?  No    In general, how would you rate your overall mental or emotional health?  Good      PHQ-2 Total Score: 0    Additional concerns today:  No    Do you feel safe in your environment? Yes    Do you have a Health Care Directive? Yes: Advance Directive has been received and scanned.    Fall risk  Fallen 2 or more times in the past year?: No  Any fall with injury in the past year?: No    Cognitive Screening   1) Repeat 3 items (Leader, Season, Table)    2) Clock draw: NORMAL  3) 3 item recall: Recalls 3 objects  Results: 3 items recalled: COGNITIVE IMPAIRMENT LESS LIKELY    Mini-CogTM Copyright RICHI Resendez. Licensed by the author for use in [facility]; reprinted with permission ([email or web address]). All rights reserved.      Do you have sleep apnea, excessive snoring or daytime drowsiness?: no    Reviewed and updated as needed this visit by clinical staff  Tobacco  Allergies  Meds  Problems  Med Hx  Surg Hx  Fam Hx  Soc Hx          Reviewed and updated as needed this visit by Provider  Allergies  Meds  Problems  Med Hx  Surg Hx        Social History     Tobacco Use "     Smoking status: Never Smoker     Smokeless tobacco: Never Used   Substance Use Topics     Alcohol use: Yes     Comment: 4-5 glass of wine per week       Alcohol Use 10/14/2019   Prescreen: >3 drinks/day or >7 drinks/week? No   Prescreen: >3 drinks/day or >7 drinks/week? -       Got flu vaccine last Sunday at [facility]. Got 65 older vaccine 10/06/2019.    Hyperlipidemia Follow-Up  Took her last tablet yesterday need refill today    Are you having any of the following symptoms? (Select all that apply)  No complaints of shortness of breath, chest pain or pressure.  No increased sweating or nausea with activity.  No left-sided neck or arm pain.  No complaints of pain in calves when walking 1-2 blocks.    Are you regularly taking any medication or supplement to lower your cholesterol?   Yes- Lipitor 40mg    Are you having muscle aches or other side effects that you think could be caused by your cholesterol lowering medication?  No        Current providers sharing in care for this patient include:   Patient Care Team:  Jennifer Dalton NP as PCP - General (Nurse Practitioner - Family)  Jennifer Dalton NP as Assigned PCP    The following health maintenance items are reviewed in Epic and correct as of today:  Health Maintenance   Topic Date Due     ZOSTER IMMUNIZATION (2 of 3) 11/19/2012     FALL RISK ASSESSMENT  10/17/2019     MEDICARE ANNUAL WELLNESS VISIT  10/17/2019     MAMMO SCREENING  10/29/2019     LIPID  10/11/2020     DEXA  10/20/2020     ADVANCE CARE PLANNING  12/16/2021     COLONOSCOPY  06/20/2023     DTAP/TDAP/TD IMMUNIZATION (3 - Td) 10/17/2028     HEPATITIS C SCREENING  Completed     PHQ-2  Completed     INFLUENZA VACCINE  Completed     PNEUMOCOCCAL IMMUNIZATION 65+ LOW/MEDIUM RISK  Completed     IPV IMMUNIZATION  Aged Out     MENINGITIS IMMUNIZATION  Aged Out     BP Readings from Last 3 Encounters:   10/18/19 110/82   11/16/18 102/60   10/17/18 108/68    Wt Readings from Last 3 Encounters:   10/18/19  62.1 kg (137 lb)   11/16/18 60.1 kg (132 lb 9.6 oz)   10/17/18 60.8 kg (134 lb)                  Patient Active Problem List   Diagnosis     Advance Care Planning     Hyperlipidemia with target LDL less than 160     Family history of osteoporosis     Non-rheumatic mitral regurgitation     Non-rheumatic tricuspid valve insufficiency     History of skin cancer     Slow transit constipation     Past Surgical History:   Procedure Laterality Date     COLONOSCOPY  2013    Normal     GYN SURGERY  Approx 1989    Laparoscopy. All ok     PUNC/ASPIR BREAST CYST      pt unsure which breast over 20 years ago     SURGICAL HISTORY OF -       laparoscopy       Social History     Tobacco Use     Smoking status: Never Smoker     Smokeless tobacco: Never Used   Substance Use Topics     Alcohol use: Yes     Comment: 4-5 glass of wine per week     Family History   Problem Relation Age of Onset     Cancer Mother         skin     Cerebrovascular Disease Mother         Several small strokes, also paternal and maternal     Osteoporosis Mother         Osteopenia     Hypertension Father         Paternal aunts/uncles     Hyperlipidemia Father         Paternal aunts and uncles     Osteoporosis Sister         Sister had to give herself shots     Thyroid Disease Sister         Thyroid irradiated- on medication now     Hyperlipidemia Sister      No Known Problems Daughter      Breast Cancer Sister 67        BRCA negative, lumpectomy in the spring this is her 2nd one     No Known Problems Daughter      Diabetes Maternal Aunt      Breast Cancer Paternal Aunt      Diabetes Maternal Grandmother         Also materials aunts/uncles     Diabetes Other          Current Outpatient Medications   Medication Sig Dispense Refill     atorvastatin (LIPITOR) 40 MG tablet Take 1 tablet (40 mg) by mouth daily 90 tablet 3     biotin 1000 MCG TABS tablet Take 1,000 mcg by mouth daily       Calcium Carbonate (CALCIUM 500 PO) Take  by mouth.       Docusate Calcium  "(STOOL SOFTENER PO) Take  by mouth.       Misc Natural Products (GLUCOSAMINE CHONDROITIN ADV PO)        Multiple Vitamin (MULTIVITAMIN OR) Take  by mouth.       Omega-3 Fatty Acids (FISH OIL PO)        Polyethylene Glycol 3350 (MIRALAX PO) Take by mouth as needed       ibuprofen (ADVIL,MOTRIN) 200 MG tablet Take 200 mg by mouth. As needed       Allergies   Allergen Reactions     Penicillins Rash       Review of Systems   Constitutional: Negative for chills and fever.   HENT: Negative for congestion, ear pain, hearing loss and sore throat.    Eyes: Negative for pain and visual disturbance.   Respiratory: Negative for cough and shortness of breath.    Cardiovascular: Negative for chest pain, palpitations and peripheral edema.   Gastrointestinal: Positive for constipation. Negative for abdominal pain, diarrhea, heartburn, hematochezia and nausea.   Breasts:  Negative for tenderness, breast mass and discharge.   Genitourinary: Negative for dysuria, frequency, genital sores, hematuria, pelvic pain, urgency, vaginal bleeding and vaginal discharge.   Musculoskeletal: Negative for arthralgias, joint swelling and myalgias.   Skin: Negative for rash.   Neurological: Negative for dizziness, weakness, headaches and paresthesias.   Psychiatric/Behavioral: Negative for mood changes. The patient is not nervous/anxious.        OBJECTIVE:   /82   Temp 98  F (36.7  C) (Oral)   Ht 1.664 m (5' 5.5\")   Wt 62.1 kg (137 lb)   BMI 22.45 kg/m   Estimated body mass index is 22.45 kg/m  as calculated from the following:    Height as of this encounter: 1.664 m (5' 5.5\").    Weight as of this encounter: 62.1 kg (137 lb).  Physical Exam  GENERAL: healthy, alert and no distress  EYES: Eyes grossly normal to inspection, PERRL and conjunctivae and sclerae normal  HENT: ear canals and TM's normal, nose and mouth without ulcers or lesions  NECK: no adenopathy, no asymmetry, masses, or scars and thyroid normal to palpation  RESP: lungs clear " to auscultation - no rales, rhonchi or wheezes  BREAST: normal without masses, tenderness or nipple discharge and no palpable axillary masses or adenopathy  CV: regular rate and rhythm, normal S1 S2, no S3 or S4, no murmur, click or rub, no peripheral edema and peripheral pulses strong  ABDOMEN: soft, nontender, no hepatosplenomegaly, no masses and bowel sounds normal   (female): deferred  MS: no gross musculoskeletal defects noted, no edema  SKIN: Less than 1.5 cm hyperpigmented stuck on lesion behind right ear.  There is another <0.5 cm brown hyperpigmented lesion behind right ear  NEURO: Normal strength and tone, mentation intact and speech normal  PSYCH: mentation appears normal, affect normal/bright    Diagnostic Test Results:  Labs reviewed in Epic  Results for orders placed or performed in visit on 10/11/19   Glucose   Result Value Ref Range    Glucose 96 70 - 99 mg/dL   Lipid panel reflex to direct LDL Fasting   Result Value Ref Range    Cholesterol 174 <200 mg/dL    Triglycerides 72 <150 mg/dL    HDL Cholesterol 64 >49 mg/dL    LDL Cholesterol Calculated 96 <100 mg/dL    Non HDL Cholesterol 110 <130 mg/dL       ASSESSMENT / PLAN:   1. Encounter for Medicare annual wellness exam    2. Hyperlipidemia with target LDL less than 160  Chronic, stable, continue current treatment.  - atorvastatin (LIPITOR) 40 MG tablet; Take 1 tablet (40 mg) by mouth daily  Dispense: 90 tablet; Refill: 3    3. Non-rheumatic mitral regurgitation  4. Non-rheumatic tricuspid valve insufficiency  Mild on echo 10/2016.  Repeat echocardiogram to follow this.  - Echocardiogram Complete; Future    5. Asymptomatic postmenopausal status    - DX Hip/Pelvis/Spine; Future    6. Family history of osteoporosis    - DX Hip/Pelvis/Spine; Future    7. Encounter for screening mammogram for breast cancer    - MA Screen Bilateral w/Emeterio; Future    8. Family history of malignant neoplasm of breast    - MA Screen Bilateral w/Emeterio; Future    9. Slow  "transit constipation  Chronic, stable, continue current treatment with over-the-counter MiraLAX and stool softener.  Discussed staying well hydrated as well.      End of Life Planning:  Patient currently has an advanced directive: Yes.  Practitioner is supportive of decision.    COUNSELING:  Reviewed preventive health counseling, as reflected in patient instructions       Regular exercise       Healthy diet/nutrition    Estimated body mass index is 22.45 kg/m  as calculated from the following:    Height as of this encounter: 1.664 m (5' 5.5\").    Weight as of this encounter: 62.1 kg (137 lb).    Weight management plan: Discussed healthy diet and exercise guidelines     reports that she has never smoked. She has never used smokeless tobacco.      Appropriate preventive services were discussed with this patient, including applicable screening as appropriate for cardiovascular disease, diabetes, osteopenia/osteoporosis, and glaucoma.  As appropriate for age/gender, discussed screening for colorectal cancer, prostate cancer, breast cancer, and cervical cancer. Checklist reviewing preventive services available has been given to the patient.    Reviewed patients plan of care and provided an AVS. The Basic Care Plan (routine screening as documented in Health Maintenance) for Herminia meets the Care Plan requirement. This Care Plan has been established and reviewed with the Patient.    Counseling Resources:  ATP IV Guidelines  Pooled Cohorts Equation Calculator  Breast Cancer Risk Calculator  FRAX Risk Assessment  ICSI Preventive Guidelines  Dietary Guidelines for Americans, 2010  USDA's MyPlate  ASA Prophylaxis  Lung CA Screening    Jennifer Dalton NP  [facility]    Identified Health Risks:  "

## 2019-11-01 ENCOUNTER — ANCILLARY PROCEDURE (OUTPATIENT)
Dept: MAMMOGRAPHY | Facility: CLINIC | Age: 69
End: 2019-11-01
Attending: NURSE PRACTITIONER
Payer: MEDICARE

## 2019-11-01 ENCOUNTER — ANCILLARY PROCEDURE (OUTPATIENT)
Dept: CARDIOLOGY | Facility: CLINIC | Age: 69
End: 2019-11-01
Attending: NURSE PRACTITIONER
Payer: MEDICARE

## 2019-11-01 ENCOUNTER — ANCILLARY PROCEDURE (OUTPATIENT)
Dept: BONE DENSITY | Facility: CLINIC | Age: 69
End: 2019-11-01
Attending: NURSE PRACTITIONER
Payer: MEDICARE

## 2019-11-01 DIAGNOSIS — I36.1 NON-RHEUMATIC TRICUSPID VALVE INSUFFICIENCY: ICD-10-CM

## 2019-11-01 DIAGNOSIS — Z80.3 FAMILY HISTORY OF MALIGNANT NEOPLASM OF BREAST: ICD-10-CM

## 2019-11-01 DIAGNOSIS — Z82.62 FAMILY HISTORY OF OSTEOPOROSIS: ICD-10-CM

## 2019-11-01 DIAGNOSIS — Z12.31 ENCOUNTER FOR SCREENING MAMMOGRAM FOR BREAST CANCER: ICD-10-CM

## 2019-11-01 DIAGNOSIS — Z78.0 ASYMPTOMATIC POSTMENOPAUSAL STATUS: ICD-10-CM

## 2019-11-01 DIAGNOSIS — I34.0 NON-RHEUMATIC MITRAL REGURGITATION: ICD-10-CM

## 2019-11-01 PROCEDURE — 77080 DXA BONE DENSITY AXIAL: CPT | Performed by: INTERNAL MEDICINE

## 2019-11-01 PROCEDURE — 93306 TTE W/DOPPLER COMPLETE: CPT | Performed by: INTERNAL MEDICINE

## 2019-11-01 PROCEDURE — 77063 BREAST TOMOSYNTHESIS BI: CPT | Mod: TC

## 2019-11-01 PROCEDURE — 77067 SCR MAMMO BI INCL CAD: CPT | Mod: TC

## 2019-11-04 PROBLEM — M85.80 OSTEOPENIA: Status: ACTIVE | Noted: 2019-11-04

## 2020-10-06 DIAGNOSIS — E78.5 HYPERLIPIDEMIA WITH TARGET LDL LESS THAN 160: ICD-10-CM

## 2020-10-08 ENCOUNTER — DOCUMENTATION ONLY (OUTPATIENT)
Dept: LAB | Facility: CLINIC | Age: 70
End: 2020-10-08

## 2020-10-08 DIAGNOSIS — Z13.1 SCREENING FOR DIABETES MELLITUS: ICD-10-CM

## 2020-10-08 DIAGNOSIS — R79.89 ABNORMAL CBC MEASUREMENT: ICD-10-CM

## 2020-10-08 DIAGNOSIS — E78.5 HYPERLIPIDEMIA WITH TARGET LDL LESS THAN 160: Primary | ICD-10-CM

## 2020-10-08 RX ORDER — ATORVASTATIN CALCIUM 40 MG/1
40 TABLET, FILM COATED ORAL DAILY
Qty: 90 TABLET | Refills: 0 | Status: SHIPPED | OUTPATIENT
Start: 2020-10-08 | End: 2020-10-26

## 2020-10-12 DIAGNOSIS — E78.5 HYPERLIPIDEMIA WITH TARGET LDL LESS THAN 160: ICD-10-CM

## 2020-10-12 DIAGNOSIS — Z13.1 SCREENING FOR DIABETES MELLITUS: ICD-10-CM

## 2020-10-12 DIAGNOSIS — R79.89 ABNORMAL CBC MEASUREMENT: ICD-10-CM

## 2020-10-12 LAB
BASOPHILS # BLD AUTO: 0 10E9/L (ref 0–0.2)
BASOPHILS NFR BLD AUTO: 0.2 %
DIFFERENTIAL METHOD BLD: NORMAL
EOSINOPHIL # BLD AUTO: 0.2 10E9/L (ref 0–0.7)
EOSINOPHIL NFR BLD AUTO: 3.7 %
ERYTHROCYTE [DISTWIDTH] IN BLOOD BY AUTOMATED COUNT: 13 % (ref 10–15)
HCT VFR BLD AUTO: 42 % (ref 35–47)
HGB BLD-MCNC: 14.4 G/DL (ref 11.7–15.7)
LYMPHOCYTES # BLD AUTO: 1.6 10E9/L (ref 0.8–5.3)
LYMPHOCYTES NFR BLD AUTO: 38.4 %
MCH RBC QN AUTO: 31.6 PG (ref 26.5–33)
MCHC RBC AUTO-ENTMCNC: 34.3 G/DL (ref 31.5–36.5)
MCV RBC AUTO: 92 FL (ref 78–100)
MONOCYTES # BLD AUTO: 0.4 10E9/L (ref 0–1.3)
MONOCYTES NFR BLD AUTO: 10.3 %
NEUTROPHILS # BLD AUTO: 1.9 10E9/L (ref 1.6–8.3)
NEUTROPHILS NFR BLD AUTO: 47.4 %
PLATELET # BLD AUTO: 195 10E9/L (ref 150–450)
RBC # BLD AUTO: 4.56 10E12/L (ref 3.8–5.2)
WBC # BLD AUTO: 4.1 10E9/L (ref 4–11)

## 2020-10-12 PROCEDURE — 80048 BASIC METABOLIC PNL TOTAL CA: CPT | Performed by: NURSE PRACTITIONER

## 2020-10-12 PROCEDURE — 80061 LIPID PANEL: CPT | Performed by: NURSE PRACTITIONER

## 2020-10-12 PROCEDURE — 85025 COMPLETE CBC W/AUTO DIFF WBC: CPT | Performed by: NURSE PRACTITIONER

## 2020-10-13 LAB
ANION GAP SERPL CALCULATED.3IONS-SCNC: 8 MMOL/L (ref 3–14)
BUN SERPL-MCNC: 23 MG/DL (ref 7–30)
CALCIUM SERPL-MCNC: 9.7 MG/DL (ref 8.5–10.1)
CHLORIDE SERPL-SCNC: 108 MMOL/L (ref 94–109)
CHOLEST SERPL-MCNC: 177 MG/DL
CO2 SERPL-SCNC: 22 MMOL/L (ref 20–32)
CREAT SERPL-MCNC: 0.76 MG/DL (ref 0.52–1.04)
GFR SERPL CREATININE-BSD FRML MDRD: 81 ML/MIN/{1.73_M2}
GLUCOSE SERPL-MCNC: 98 MG/DL (ref 70–99)
HDLC SERPL-MCNC: 67 MG/DL
LDLC SERPL CALC-MCNC: 92 MG/DL
NONHDLC SERPL-MCNC: 110 MG/DL
POTASSIUM SERPL-SCNC: 4.1 MMOL/L (ref 3.4–5.3)
SODIUM SERPL-SCNC: 138 MMOL/L (ref 133–144)
TRIGL SERPL-MCNC: 92 MG/DL

## 2020-10-25 ASSESSMENT — ENCOUNTER SYMPTOMS
DYSURIA: 0
HEMATOCHEZIA: 0
NERVOUS/ANXIOUS: 0
PALPITATIONS: 0
FEVER: 0
SHORTNESS OF BREATH: 0
BREAST MASS: 0
ABDOMINAL PAIN: 0
DIZZINESS: 0
DIARRHEA: 0
COUGH: 0
WEAKNESS: 0
ARTHRALGIAS: 1
MYALGIAS: 0
SORE THROAT: 0
PARESTHESIAS: 0
HEMATURIA: 0
JOINT SWELLING: 0
NAUSEA: 0
HEARTBURN: 0
CHILLS: 0
HEADACHES: 0
CONSTIPATION: 1
FREQUENCY: 0
EYE PAIN: 0

## 2020-10-25 ASSESSMENT — ACTIVITIES OF DAILY LIVING (ADL): CURRENT_FUNCTION: NO ASSISTANCE NEEDED

## 2020-10-26 ENCOUNTER — OFFICE VISIT (OUTPATIENT)
Dept: FAMILY MEDICINE | Facility: CLINIC | Age: 70
End: 2020-10-26
Payer: MEDICARE

## 2020-10-26 VITALS
BODY MASS INDEX: 22.02 KG/M2 | HEART RATE: 78 BPM | SYSTOLIC BLOOD PRESSURE: 118 MMHG | RESPIRATION RATE: 16 BRPM | OXYGEN SATURATION: 98 % | WEIGHT: 132.2 LBS | HEIGHT: 65 IN | DIASTOLIC BLOOD PRESSURE: 68 MMHG

## 2020-10-26 DIAGNOSIS — E78.5 HYPERLIPIDEMIA WITH TARGET LDL LESS THAN 160: ICD-10-CM

## 2020-10-26 DIAGNOSIS — M85.80 OSTEOPENIA, UNSPECIFIED LOCATION: ICD-10-CM

## 2020-10-26 DIAGNOSIS — I34.0 NON-RHEUMATIC MITRAL REGURGITATION: ICD-10-CM

## 2020-10-26 DIAGNOSIS — Z85.828 HISTORY OF SKIN CANCER: ICD-10-CM

## 2020-10-26 DIAGNOSIS — Z00.00 ENCOUNTER FOR MEDICARE ANNUAL WELLNESS EXAM: Primary | ICD-10-CM

## 2020-10-26 PROCEDURE — G0439 PPPS, SUBSEQ VISIT: HCPCS | Performed by: NURSE PRACTITIONER

## 2020-10-26 RX ORDER — ATORVASTATIN CALCIUM 40 MG/1
40 TABLET, FILM COATED ORAL DAILY
Qty: 90 TABLET | Refills: 3 | Status: SHIPPED | OUTPATIENT
Start: 2020-10-26 | End: 2021-10-27

## 2020-10-26 ASSESSMENT — ENCOUNTER SYMPTOMS
COUGH: 0
CONSTIPATION: 1
JOINT SWELLING: 0
DYSURIA: 0
HEADACHES: 0
NAUSEA: 0
HEARTBURN: 0
NERVOUS/ANXIOUS: 0
MYALGIAS: 0
BREAST MASS: 0
PALPITATIONS: 0
SORE THROAT: 0
SHORTNESS OF BREATH: 0
PARESTHESIAS: 0
HEMATURIA: 0
WEAKNESS: 0
HEMATOCHEZIA: 0
ABDOMINAL PAIN: 0
DIZZINESS: 0
ARTHRALGIAS: 1
FEVER: 0
CHILLS: 0
DIARRHEA: 0
FREQUENCY: 0
EYE PAIN: 0

## 2020-10-26 ASSESSMENT — ACTIVITIES OF DAILY LIVING (ADL): CURRENT_FUNCTION: NO ASSISTANCE NEEDED

## 2020-10-26 ASSESSMENT — MIFFLIN-ST. JEOR: SCORE: 1130.54

## 2020-10-26 NOTE — PATIENT INSTRUCTIONS
Patient Education   Personalized Prevention Plan  You are due for the preventive services outlined below.  Your care team is available to assist you in scheduling these services.  If you have already completed any of these items, please share that information with your care team to update in your medical record.  Health Maintenance Due   Topic Date Due     FALL RISK ASSESSMENT  10/18/2020     Annual Wellness Visit  10/18/2020     Mammogram  11/01/2020             Lifestyle measures that are recommended to keep your bones as healthy as possible and to reduce bone loss include:    - Eat foods with a lot of calcium (diet plus supplementation should be 1200 MG of elemental calcium per day in divided doses).  Use of the International Osteoporosis Foundation Calcium Calculator to get an estimate of daily total intake in the diet (www.iofcalciumcalculator).  - Eat foods with a lot of Vitamin D (and take a daily Vitamin D 800 unit supplement)  - Be active for at least 30 minutes, most days of the week  - Avoid smoking  - Limit the amount of alcohol you drink to 1 drink a day at most  - Prevent falls (by keeping your muscles strong, have your eyes checked, wear sturdy comfortable shoes, keep your home and floors clear)    Vitamin D up to 2,000 units daily    Genitourinary Syndrome of Menopause    Use vaginal moisturizer three times per week (Replens, Vagisil Moisturizer, Feminease, Moist Again, or K-Y Liquibeads)    Use vaginal lubricant with sex (water-based (such as Astroglide, Slippery Stuff, K-Y Jelly), silicone-based (such as Pjur, ID Millennium), or oil-based (such as Elegance Women's Lubricant).  Oil-based lubricants cause breakdown of latex condoms

## 2020-10-26 NOTE — PROGRESS NOTES
"SUBJECTIVE:   Herminia Nowak is a 70 year old female who presents for Preventive Visit.      Patient has been advised of split billing requirements and indicates understanding: Yes   Are you in the first 12 months of your Medicare coverage?  No    Healthy Habits:     In general, how would you rate your overall health?  Good    Frequency of exercise:  4-5 days/week    Duration of exercise:  30-45 minutes    Do you usually eat at least 4 servings of fruit and vegetables a day, include whole grains    & fiber and avoid regularly eating high fat or \"junk\" foods?  No    Taking medications regularly:  Yes    Ability to successfully perform activities of daily living:  No assistance needed    Home Safety:  No safety concerns identified    Hearing Impairment:  No hearing concerns    In the past 6 months, have you been bothered by leaking of urine?  No    In general, how would you rate your overall mental or emotional health?  Good      PHQ-2 Total Score: 0    Additional concerns today:  Yes    Right base of thumb gets flares of pain.  Left shoulder pain, anterior, she thinks she moved it wrong recently.  Just started hurting.    Irritation on labia, and redness.    Do you feel safe in your environment? YES    Have you ever done Advance Care Planning? (For example, a Health Directive, POLST, or a discussion with a medical provider or your loved ones about your wishes): Yes, advance care planning is on file.    Fall risk  Fallen 2 or more times in the past year?: No  Any fall with injury in the past year?: No    Cognitive Screening   1) Repeat 3 items (Leader, Season, Table)    2) Clock draw: NORMAL  3) 3 item recall: Recalls 3 objects  Results: 3 items recalled: COGNITIVE IMPAIRMENT LESS LIKELY    Mini-CogTM Copyright RICHI Resendez. Licensed by the author for use in [employer]; reprinted with permission ([email or web address].[email or web address]). All rights reserved.      Do you have sleep apnea, excessive snoring or daytime drowsiness?: " no    Reviewed and updated as needed this visit by clinical staff  Tobacco  Allergies  Meds  Problems  Med Hx  Surg Hx  Fam Hx          Reviewed and updated as needed this visit by Provider  Tobacco  Allergies  Meds  Problems  Med Hx  Surg Hx  Fam Hx         Social History     Tobacco Use     Smoking status: Never Smoker     Smokeless tobacco: Never Used   Substance Use Topics     Alcohol use: Yes     Comment: 4-5 glass of wine per week     If you drink alcohol do you typically have >3 drinks per day or >7 drinks per week? No    No flowsheet data found.        Hyperlipidemia Follow-Up      Are you regularly taking any medication or supplement to lower your cholesterol?   Yes- atorvastatin    Current providers sharing in care for this patient include: Patient Care Team:  Jennifer Dalton NP as PCP - General (Nurse Practitioner - Family)  Jennifer Dalton NP as Assigned PCP    The following health maintenance items are reviewed in Epic and correct as of today:  Health Maintenance   Topic Date Due     FALL RISK ASSESSMENT  10/18/2020     MAMMO SCREENING  11/01/2020     LIPID  10/12/2021     MEDICARE ANNUAL WELLNESS VISIT  10/26/2021     DEXA  11/01/2021     COLORECTAL CANCER SCREENING  06/20/2023     ADVANCE CARE PLANNING  10/26/2025     DTAP/TDAP/TD IMMUNIZATION (3 - Td) 10/17/2028     HEPATITIS C SCREENING  Completed     PHQ-2  Completed     INFLUENZA VACCINE  Completed     Pneumococcal Vaccine: 65+ Years  Completed     ZOSTER IMMUNIZATION  Completed     Pneumococcal Vaccine: Pediatrics (0 to 5 Years) and At-Risk Patients (6 to 64 Years)  Aged Out     IPV IMMUNIZATION  Aged Out     MENINGITIS IMMUNIZATION  Aged Out     HEPATITIS B IMMUNIZATION  Aged Out     BP Readings from Last 3 Encounters:   10/26/20 118/68   10/18/19 110/82   11/16/18 102/60    Wt Readings from Last 3 Encounters:   10/26/20 60 kg (132 lb 3.2 oz)   10/18/19 62.1 kg (137 lb)   11/16/18 60.1 kg (132 lb 9.6 oz)                   Patient Active Problem List   Diagnosis     Advance Care Planning     Hyperlipidemia with target LDL less than 160     Family history of osteoporosis     Non-rheumatic mitral regurgitation     Non-rheumatic tricuspid valve insufficiency     History of skin cancer     Slow transit constipation     Osteopenia     Past Surgical History:   Procedure Laterality Date     COLONOSCOPY  2013    Normal     GYN SURGERY  Approx 1989    Laparoscopy. All ok     PUNC/ASPIR BREAST CYST      pt unsure which breast over 20 years ago     SURGICAL HISTORY OF -       laparoscopy       Social History     Tobacco Use     Smoking status: Never Smoker     Smokeless tobacco: Never Used   Substance Use Topics     Alcohol use: Yes     Comment: 4-5 glass of wine per week     Family History   Problem Relation Age of Onset     Cancer Mother         skin     Cerebrovascular Disease Mother         Several small strokes, also paternal and maternal     Osteoporosis Mother         Osteopenia     Hypertension Father         Paternal aunts/uncles     Hyperlipidemia Father         Paternal aunts and uncles     Osteoporosis Sister         Sister had to give herself shots     Thyroid Disease Sister         Thyroid irradiated- on medication now     Hyperlipidemia Sister      No Known Problems Daughter      Breast Cancer Sister 67        BRCA negative, lumpectomy in the spring this is her 2nd one     No Known Problems Daughter      Diabetes Maternal Aunt      Breast Cancer Paternal Aunt      Diabetes Maternal Grandmother         Also materials aunts/uncles     Diabetes Other          Current Outpatient Medications   Medication Sig Dispense Refill     atorvastatin (LIPITOR) 40 MG tablet Take 1 tablet (40 mg) by mouth daily 90 tablet 3     Calcium Carbonate (CALCIUM 500 PO) Take  by mouth.       Docusate Calcium (STOOL SOFTENER PO) Take  by mouth.       Misc Natural Products (GLUCOSAMINE CHONDROITIN ADV PO)        Multiple Vitamin (MULTIVITAMIN OR) Take  by  "mouth.       Omega-3 Fatty Acids (FISH OIL PO)        Polyethylene Glycol 3350 (MIRALAX PO) Take by mouth as needed       Allergies   Allergen Reactions     Penicillins Rash         Review of Systems   Constitutional: Negative for chills and fever.   HENT: Negative for congestion, ear pain, hearing loss and sore throat.    Eyes: Negative for pain and visual disturbance.   Respiratory: Negative for cough and shortness of breath.    Cardiovascular: Negative for chest pain, palpitations and peripheral edema.   Gastrointestinal: Positive for constipation. Negative for abdominal pain, diarrhea, heartburn, hematochezia and nausea.   Breasts:  Negative for tenderness, breast mass and discharge.   Genitourinary: Negative for dysuria, frequency, genital sores, hematuria, pelvic pain, urgency, vaginal bleeding and vaginal discharge.   Musculoskeletal: Positive for arthralgias. Negative for joint swelling and myalgias.   Skin: Negative for rash.   Neurological: Negative for dizziness, weakness, headaches and paresthesias.   Psychiatric/Behavioral: Negative for mood changes. The patient is not nervous/anxious.        OBJECTIVE:   /68 (BP Location: Right arm)   Pulse 78   Resp 16   Ht 1.651 m (5' 5\")   Wt 60 kg (132 lb 3.2 oz)   SpO2 98%   BMI 22.00 kg/m   Estimated body mass index is 22 kg/m  as calculated from the following:    Height as of this encounter: 1.651 m (5' 5\").    Weight as of this encounter: 60 kg (132 lb 3.2 oz).  Physical Exam  GENERAL: healthy, alert and no distress  EYES: Eyes grossly normal to inspection, PERRL and conjunctivae and sclerae normal  HENT: ear canals and TM's normal, nose and mouth without ulcers or lesions  NECK: no adenopathy, no asymmetry, masses, or scars and thyroid normal to palpation  RESP: lungs clear to auscultation - no rales, rhonchi or wheezes  BREAST: normal without masses, tenderness or nipple discharge and no palpable axillary masses or adenopathy  CV: regular rate and " "rhythm, normal S1 S2, no S3 or S4, no murmur, click or rub, no peripheral edema and peripheral pulses strong  ABDOMEN: soft, nontender, no hepatosplenomegaly, no masses and bowel sounds normal   (female): erythema and mild swelling of labia  MS: no gross musculoskeletal defects noted, no edema  SKIN: no suspicious lesions or rashes  NEURO: Normal strength and tone, mentation intact and speech normal  PSYCH: mentation appears normal, affect normal/bright    Diagnostic Test Results:  Labs reviewed in Epic    ASSESSMENT / PLAN:   1. Encounter for Medicare annual wellness exam    2. Hyperlipidemia with target LDL less than 160  Chronic, stable, continue current treatment.   - atorvastatin (LIPITOR) 40 MG tablet; Take 1 tablet (40 mg) by mouth daily  Dispense: 90 tablet; Refill: 3  - Lipid panel reflex to direct LDL Fasting; Future    3. Non-rheumatic mitral regurgitation    - **Basic metabolic panel FUTURE 1yr; Future    4. Osteopenia, unspecified location  Discussed calcium and vitamin D intake, walking    5. History of skin cancer  Patient sees Dermatology yearly for skin checks.    She will trial over the counter hydrocortisone 1% twice daily to labia, allow to air out at night.  She will message me if not improving.    Patient has been advised of split billing requirements and indicates understanding: No  COUNSELING:  Reviewed preventive health counseling, as reflected in patient instructions       Regular exercise       Healthy diet/nutrition    Estimated body mass index is 22 kg/m  as calculated from the following:    Height as of this encounter: 1.651 m (5' 5\").    Weight as of this encounter: 60 kg (132 lb 3.2 oz).        She reports that she has never smoked. She has never used smokeless tobacco.      Appropriate preventive services were discussed with this patient, including applicable screening as appropriate for cardiovascular disease, diabetes, osteopenia/osteoporosis, and glaucoma.  As appropriate for " age/gender, discussed screening for colorectal cancer, prostate cancer, breast cancer, and cervical cancer. Checklist reviewing preventive services available has been given to the patient.    Reviewed patients plan of care and provided an AVS. The Basic Care Plan (routine screening as documented in Health Maintenance) for Herminia meets the Care Plan requirement. This Care Plan has been established and reviewed with the Patient.    Counseling Resources:  ATP IV Guidelines  Pooled Cohorts Equation Calculator  Breast Cancer Risk Calculator  Breast Cancer: Medication to Reduce Risk  FRAX Risk Assessment  ICSI Preventive Guidelines  Dietary Guidelines for Americans, 2010  USDA's MyPlate  ASA Prophylaxis  Lung CA Screening    Jennifer Dalton NP  [facility]    Identified Health Risks:

## 2020-11-03 ENCOUNTER — ANCILLARY PROCEDURE (OUTPATIENT)
Dept: MAMMOGRAPHY | Facility: CLINIC | Age: 70
End: 2020-11-03
Attending: NURSE PRACTITIONER
Payer: MEDICARE

## 2020-11-03 DIAGNOSIS — Z12.31 VISIT FOR SCREENING MAMMOGRAM: ICD-10-CM

## 2020-11-03 PROCEDURE — 77067 SCR MAMMO BI INCL CAD: CPT | Mod: TC | Performed by: RADIOLOGY

## 2020-11-03 PROCEDURE — 77063 BREAST TOMOSYNTHESIS BI: CPT | Mod: TC | Performed by: RADIOLOGY

## 2021-03-12 ENCOUNTER — MYC MEDICAL ADVICE (OUTPATIENT)
Dept: FAMILY MEDICINE | Facility: CLINIC | Age: 71
End: 2021-03-12

## 2021-10-02 ENCOUNTER — HEALTH MAINTENANCE LETTER (OUTPATIENT)
Age: 71
End: 2021-10-02

## 2021-10-24 ASSESSMENT — ENCOUNTER SYMPTOMS
FEVER: 0
DIARRHEA: 0
DYSURIA: 0
WEAKNESS: 0
ABDOMINAL PAIN: 0
COUGH: 0
NAUSEA: 0
HEARTBURN: 0
PARESTHESIAS: 0
JOINT SWELLING: 0
MYALGIAS: 0
BREAST MASS: 0
NERVOUS/ANXIOUS: 1
DIZZINESS: 0
SHORTNESS OF BREATH: 0
EYE PAIN: 0
HEMATOCHEZIA: 0
FREQUENCY: 0
HEMATURIA: 0
PALPITATIONS: 0
CONSTIPATION: 1
CHILLS: 0
HEADACHES: 0
SORE THROAT: 0

## 2021-10-24 ASSESSMENT — ACTIVITIES OF DAILY LIVING (ADL): CURRENT_FUNCTION: NO ASSISTANCE NEEDED

## 2021-10-27 ENCOUNTER — LAB (OUTPATIENT)
Dept: LAB | Facility: CLINIC | Age: 71
End: 2021-10-27

## 2021-10-27 ENCOUNTER — OFFICE VISIT (OUTPATIENT)
Dept: FAMILY MEDICINE | Facility: CLINIC | Age: 71
End: 2021-10-27
Payer: MEDICARE

## 2021-10-27 VITALS
DIASTOLIC BLOOD PRESSURE: 80 MMHG | SYSTOLIC BLOOD PRESSURE: 110 MMHG | WEIGHT: 133.8 LBS | HEIGHT: 65 IN | BODY MASS INDEX: 22.29 KG/M2 | HEART RATE: 69 BPM | OXYGEN SATURATION: 96 % | RESPIRATION RATE: 16 BRPM | TEMPERATURE: 98.3 F

## 2021-10-27 DIAGNOSIS — Z12.31 VISIT FOR SCREENING MAMMOGRAM: ICD-10-CM

## 2021-10-27 DIAGNOSIS — K59.01 SLOW TRANSIT CONSTIPATION: ICD-10-CM

## 2021-10-27 DIAGNOSIS — I34.0 NON-RHEUMATIC MITRAL REGURGITATION: ICD-10-CM

## 2021-10-27 DIAGNOSIS — M85.80 OSTEOPENIA, UNSPECIFIED LOCATION: ICD-10-CM

## 2021-10-27 DIAGNOSIS — E78.5 HYPERLIPIDEMIA WITH TARGET LDL LESS THAN 160: ICD-10-CM

## 2021-10-27 DIAGNOSIS — Z85.828 HISTORY OF SKIN CANCER: ICD-10-CM

## 2021-10-27 DIAGNOSIS — Z00.00 ENCOUNTER FOR MEDICARE ANNUAL WELLNESS EXAM: Primary | ICD-10-CM

## 2021-10-27 PROCEDURE — 36415 COLL VENOUS BLD VENIPUNCTURE: CPT

## 2021-10-27 PROCEDURE — 80061 LIPID PANEL: CPT

## 2021-10-27 PROCEDURE — G0439 PPPS, SUBSEQ VISIT: HCPCS | Performed by: NURSE PRACTITIONER

## 2021-10-27 PROCEDURE — 80048 BASIC METABOLIC PNL TOTAL CA: CPT

## 2021-10-27 RX ORDER — LATANOPROST 50 UG/ML
1 SOLUTION/ DROPS OPHTHALMIC AT BEDTIME
COMMUNITY
Start: 2021-09-25 | End: 2023-10-31

## 2021-10-27 RX ORDER — ATORVASTATIN CALCIUM 40 MG/1
40 TABLET, FILM COATED ORAL DAILY
Qty: 90 TABLET | Refills: 3 | Status: SHIPPED | OUTPATIENT
Start: 2021-10-27 | End: 2022-10-28

## 2021-10-27 ASSESSMENT — MIFFLIN-ST. JEOR: SCORE: 1136.75

## 2021-10-27 ASSESSMENT — ENCOUNTER SYMPTOMS
PARESTHESIAS: 0
DYSURIA: 0
ABDOMINAL PAIN: 0
COUGH: 0
HEMATOCHEZIA: 0
CHILLS: 0
HEARTBURN: 0
MYALGIAS: 0
SORE THROAT: 0
NAUSEA: 0
CONSTIPATION: 1
EYE PAIN: 0
FREQUENCY: 0
NERVOUS/ANXIOUS: 1
PALPITATIONS: 0
DIARRHEA: 0
HEADACHES: 0
HEMATURIA: 0
JOINT SWELLING: 0
BREAST MASS: 0
DIZZINESS: 0
WEAKNESS: 0
SHORTNESS OF BREATH: 0
FEVER: 0

## 2021-10-27 ASSESSMENT — ACTIVITIES OF DAILY LIVING (ADL): CURRENT_FUNCTION: NO ASSISTANCE NEEDED

## 2021-10-27 ASSESSMENT — PAIN SCALES - GENERAL: PAINLEVEL: NO PAIN (0)

## 2021-10-27 NOTE — PROGRESS NOTES
"SUBJECTIVE:   Herminia Nowak is a 71 year old female who presents for Preventive Visit.    -Moderna booster? -She is scheduled to get this weekend  -lipids, BMP drawn this morning and is pending  -Mammogram is due in a month and dexa.  Discussed with patient and will hold on DEXA/bone density screening this year.    Call pharmacy to correct prescriber JEAN (not BOB)    Patient has been advised of split billing requirements and indicates understanding: Yes   Are you in the first 12 months of your Medicare coverage?  No    Healthy Habits:     In general, how would you rate your overall health?  Good    Frequency of exercise:  4-5 days/week    Duration of exercise:  30-45 minutes    Do you usually eat at least 4 servings of fruit and vegetables a day, include whole grains    & fiber and avoid regularly eating high fat or \"junk\" foods?  Yes    Taking medications regularly:  Yes    Medication side effects:  None    Ability to successfully perform activities of daily living:  No assistance needed    Home Safety:  No safety concerns identified    Hearing Impairment:  No hearing concerns    In the past 6 months, have you been bothered by leaking of urine?  No    In general, how would you rate your overall mental or emotional health?  Good      PHQ-2 Total Score: 0    Additional concerns today:  No    Do you feel safe in your environment? YES    Have you ever done Advance Care Planning? (For example, a Health Directive, POLST, or a discussion with a medical provider or your loved ones about your wishes): Yes, patient states has an Advance Care Planning document and will bring a copy to the clinic.      Fall risk  Fallen 2 or more times in the past year?: No  Any fall with injury in the past year?: No    Cognitive Screening   1) Repeat 3 items (Leader, Season, Table)      2) Clock draw: NORMAL  3) 3 item recall: Recalls 3 objects  Results: 3 items recalled: COGNITIVE IMPAIRMENT LESS LIKELY    Mini-CogTM Copyright S " Mal. Licensed by the author for use in [facility]; reprinted with permission (beatriz@[facility]). All rights reserved.      Do you have sleep apnea, excessive snoring or daytime drowsiness?: no    Reviewed and updated as needed this visit by clinical staff  Tobacco  Allergies  Meds  Problems  Med Hx  Surg Hx  Fam Hx  Soc Hx          Reviewed and updated as needed this visit by Provider  Tobacco  Allergies  Meds  Problems  Med Hx  Surg Hx  Fam Hx  Soc Hx         Social History     Tobacco Use     Smoking status: Never Smoker     Smokeless tobacco: Never Used   Substance Use Topics     Alcohol use: Yes     Comment: Occasional glass of wine     If you drink alcohol do you typically have >3 drinks per day or >7 drinks per week? No    No flowsheet data found.      Current providers sharing in care for this patient include:   Patient Care Team:  Jennifer Dalton NP as PCP - General (Nurse Practitioner - Family)  Jennifer Dalton NP as Assigned PCP    The following health maintenance items are reviewed in Epic and correct as of today:  Health Maintenance Due   Topic Date Due     LIPID  10/12/2021     FALL RISK ASSESSMENT  10/26/2021     DEXA  11/01/2021     MAMMO SCREENING  11/03/2021     BP Readings from Last 3 Encounters:   10/27/21 110/80   10/26/20 118/68   10/18/19 110/82    Wt Readings from Last 3 Encounters:   10/27/21 60.7 kg (133 lb 12.8 oz)   10/26/20 60 kg (132 lb 3.2 oz)   10/18/19 62.1 kg (137 lb)                  Patient Active Problem List   Diagnosis     Advance Care Planning     Hyperlipidemia with target LDL less than 160     Family history of osteoporosis     Non-rheumatic mitral regurgitation     Non-rheumatic tricuspid valve insufficiency     History of skin cancer     Slow transit constipation     Osteopenia     Past Surgical History:   Procedure Laterality Date     COLONOSCOPY  2013    Normal     GYN SURGERY  Approx 1989    Laparoscopy. All ok     PUNC/ASPIR BREAST CYST       pt unsure which breast over 20 years ago     SURGICAL HISTORY OF -       laparoscopy       Social History     Tobacco Use     Smoking status: Never Smoker     Smokeless tobacco: Never Used   Substance Use Topics     Alcohol use: Yes     Comment: Occasional glass of wine     Family History   Problem Relation Age of Onset     Cancer Mother         skin     Cerebrovascular Disease Mother         Several small strokes, also paternal and maternal     Osteoporosis Mother         Osteopenia     Hypertension Father         Paternal aunts/uncles     Hyperlipidemia Father         Paternal aunts and uncles     Osteoporosis Sister         Sister had to give herself shots     Thyroid Disease Sister         Thyroid irradiated- on medication now     Hyperlipidemia Sister      No Known Problems Daughter      Breast Cancer Sister 67        BRCA negative, lumpectomy in the spring this is her 2nd one     No Known Problems Daughter      Diabetes Maternal Aunt      Breast Cancer Paternal Aunt      Diabetes Maternal Grandmother         Also materials aunts/uncles     Diabetes Other          Current Outpatient Medications   Medication Sig Dispense Refill     atorvastatin (LIPITOR) 40 MG tablet Take 1 tablet (40 mg) by mouth daily 90 tablet 3     Calcium Carbonate (CALCIUM 500 PO) Take  by mouth.       Docusate Calcium (STOOL SOFTENER PO) Take  by mouth.       latanoprost (XALATAN) 0.005 % ophthalmic solution Place 1 drop into both eyes At Bedtime       Misc Natural Products (GLUCOSAMINE CHONDROITIN ADV PO)        Multiple Vitamin (MULTIVITAMIN OR) Take  by mouth.       Omega-3 Fatty Acids (FISH OIL PO)        Polyethylene Glycol 3350 (MIRALAX PO) Take by mouth as needed       Allergies   Allergen Reactions     Penicillins Rash     Mammogram Screening: Mammogram Screening: Recommended mammography every 1-2 years with patient discussion and risk factor consideration    FHS-7:   Breast CA Risk Assessment (FHS-7) 10/24/2021   Did any of  "your first-degree relatives have breast or ovarian cancer? Yes   Did any of your relatives have bilateral breast cancer? Yes   Did any man in your family have breast cancer? No   Did any woman in your family have breast and ovarian cancer? Yes   Did any woman in your family have breast cancer before age 50 y? No   Do you have 2 or more relatives with breast and/or ovarian cancer? Yes   Do you have 2 or more relatives with breast and/or bowel cancer? Yes     Pertinent mammograms are reviewed under the imaging tab.    Review of Systems   Constitutional: Negative for chills and fever.   HENT: Negative for congestion, ear pain, hearing loss and sore throat.    Eyes: Negative for pain and visual disturbance.   Respiratory: Negative for cough and shortness of breath.    Cardiovascular: Negative for chest pain, palpitations and peripheral edema.   Gastrointestinal: Positive for constipation. Negative for abdominal pain, diarrhea, heartburn, hematochezia and nausea.   Breasts:  Negative for tenderness, breast mass and discharge.   Genitourinary: Positive for urgency. Negative for dysuria, frequency, genital sores, hematuria, pelvic pain, vaginal bleeding and vaginal discharge.   Musculoskeletal: Negative for joint swelling and myalgias.   Skin: Negative for rash.   Neurological: Negative for dizziness, weakness, headaches and paresthesias.   Psychiatric/Behavioral: Negative for mood changes. The patient is nervous/anxious.        OBJECTIVE:   /80   Pulse 69   Temp 98.3  F (36.8  C) (Oral)   Resp 16   Ht 1.657 m (5' 5.25\")   Wt 60.7 kg (133 lb 12.8 oz)   SpO2 96%   BMI 22.10 kg/m   Estimated body mass index is 22.1 kg/m  as calculated from the following:    Height as of this encounter: 1.657 m (5' 5.25\").    Weight as of this encounter: 60.7 kg (133 lb 12.8 oz).  Physical Exam  GENERAL: healthy, alert and no distress  EYES: Eyes grossly normal to inspection, PERRL and conjunctivae and sclerae normal  HENT: ear " "canals and TM's normal, nose and mouth without ulcers or lesions  NECK: no adenopathy, no asymmetry, masses, or scars and thyroid normal to palpation  RESP: lungs clear to auscultation - no rales, rhonchi or wheezes  BREAST: normal without masses, tenderness or nipple discharge and no palpable axillary masses or adenopathy  CV: regular rate and rhythm, normal S1 S2, no S3 or S4, no murmur, click or rub, no peripheral edema and peripheral pulses strong  ABDOMEN: soft, nontender, no hepatosplenomegaly, no masses and bowel sounds normal  MS: no gross musculoskeletal defects noted, no edema  SKIN: keratoses - seborrheic on back  NEURO: Normal strength and tone, mentation intact and speech normal  PSYCH: mentation appears normal, affect normal/bright      ASSESSMENT / PLAN:       ICD-10-CM    1. Encounter for Medicare annual wellness exam  Z00.00    2. Hyperlipidemia with target LDL less than 160  E78.5 Chronic, stable, continue current treatment.   atorvastatin (LIPITOR) 40 MG tablet   3. Visit for screening mammogram  Z12.31 MA Screen Bilateral        4. Slow transit constipation  K59.01 Chronic, stable   5. Osteopenia, unspecified location  M85.80    6. History of skin cancer  Z85.828  follows with dermatology yearly for skin check       Patient has been advised of split billing requirements and indicates understanding: Yes  COUNSELING:  Reviewed preventive health counseling, as reflected in patient instructions       Regular exercise       Healthy diet/nutrition    Estimated body mass index is 22.1 kg/m  as calculated from the following:    Height as of this encounter: 1.657 m (5' 5.25\").    Weight as of this encounter: 60.7 kg (133 lb 12.8 oz).        She reports that she has never smoked. She has never used smokeless tobacco.      Appropriate preventive services were discussed with this patient, including applicable screening as appropriate for cardiovascular disease, diabetes, osteopenia/osteoporosis, and " glaucoma.  As appropriate for age/gender, discussed screening for colorectal cancer, prostate cancer, breast cancer, and cervical cancer. Checklist reviewing preventive services available has been given to the patient.    Reviewed patients plan of care and provided an AVS. The Basic Care Plan (routine screening as documented in Health Maintenance) for Herminia meets the Care Plan requirement. This Care Plan has been established and reviewed with the Patient.    Counseling Resources:  ATP IV Guidelines  Pooled Cohorts Equation Calculator  Breast Cancer Risk Calculator  Breast Cancer: Medication to Reduce Risk  FRAX Risk Assessment  ICSI Preventive Guidelines  Dietary Guidelines for Americans, 2010  USDA's MyPlate  ASA Prophylaxis  Lung CA Screening    Jennifer Dalton NP  [facility]    Identified Health Risks:

## 2021-10-27 NOTE — Clinical Note
Please call this patient's pharmacy [facility] in [place].  I prescribed atorvastatin for this patient and her pharmacy is using my maiden name Soledad instead of Sha on patient's prescriptions.  Please ask them to correct/update their record with my current name.    Jennifer Dalton, DNP, APRN, CNP

## 2021-10-27 NOTE — PATIENT INSTRUCTIONS
Patient Education   Personalized Prevention Plan  You are due for the preventive services outlined below.  Your care team is available to assist you in scheduling these services.  If you have already completed any of these items, please share that information with your care team to update in your medical record.  Health Maintenance Due   Topic Date Due     ANNUAL REVIEW OF HM ORDERS  Never done     Cholesterol Lab  10/12/2021     FALL RISK ASSESSMENT  10/26/2021     Annual Wellness Visit  10/26/2021     Osteoporosis Screening  11/01/2021     Mammogram  11/03/2021           Magnesium oxide can help with relaxation/sleep and serve as a laxative effect (more so Magnesium glycine)

## 2021-10-28 LAB
ANION GAP SERPL CALCULATED.3IONS-SCNC: 8 MMOL/L (ref 3–14)
BUN SERPL-MCNC: 18 MG/DL (ref 7–30)
CALCIUM SERPL-MCNC: 9.3 MG/DL (ref 8.5–10.1)
CHLORIDE BLD-SCNC: 107 MMOL/L (ref 94–109)
CHOLEST SERPL-MCNC: 163 MG/DL
CO2 SERPL-SCNC: 24 MMOL/L (ref 20–32)
CREAT SERPL-MCNC: 0.72 MG/DL (ref 0.52–1.04)
FASTING STATUS PATIENT QL REPORTED: YES
GFR SERPL CREATININE-BSD FRML MDRD: 86 ML/MIN/1.73M2
GLUCOSE BLD-MCNC: 94 MG/DL (ref 70–99)
HDLC SERPL-MCNC: 61 MG/DL
LDLC SERPL CALC-MCNC: 85 MG/DL
NONHDLC SERPL-MCNC: 102 MG/DL
POTASSIUM BLD-SCNC: 3.8 MMOL/L (ref 3.4–5.3)
SODIUM SERPL-SCNC: 139 MMOL/L (ref 133–144)
TRIGL SERPL-MCNC: 83 MG/DL

## 2021-12-15 ENCOUNTER — ANCILLARY PROCEDURE (OUTPATIENT)
Dept: MAMMOGRAPHY | Facility: CLINIC | Age: 71
End: 2021-12-15
Attending: NURSE PRACTITIONER
Payer: MEDICARE

## 2021-12-15 DIAGNOSIS — Z12.31 VISIT FOR SCREENING MAMMOGRAM: ICD-10-CM

## 2021-12-15 PROCEDURE — 77067 SCR MAMMO BI INCL CAD: CPT | Mod: TC | Performed by: RADIOLOGY

## 2021-12-15 PROCEDURE — 77063 BREAST TOMOSYNTHESIS BI: CPT | Mod: TC | Performed by: RADIOLOGY

## 2022-08-20 ENCOUNTER — MYC MEDICAL ADVICE (OUTPATIENT)
Dept: FAMILY MEDICINE | Facility: CLINIC | Age: 72
End: 2022-08-20

## 2022-08-22 NOTE — TELEPHONE ENCOUNTER
Routing MyChart to PCP.  Patient is asking if her pre-op physical will also count for yearly physical.  (RN is assuming it is fine but wanted to double check).    Rk Kinney RN

## 2022-08-22 NOTE — TELEPHONE ENCOUNTER
RN replied to patient via [email or web address]. See message for details.     Jose Cruz Velazquez RN, BSN, PHN  [facility]: [place]

## 2022-08-22 NOTE — TELEPHONE ENCOUNTER
The insurance code is different for a Pre-op physical compared to annual physical (annual physical is a preventative code whereas the pre-op physical is office visit code).    But I am fine with making sure we do any and all lab work that she usually gets every year at the time of her Pre-op physical.    Jennifer Dalton, DNP, APRN, CNP

## 2022-08-31 ENCOUNTER — OFFICE VISIT (OUTPATIENT)
Dept: FAMILY MEDICINE | Facility: CLINIC | Age: 72
End: 2022-08-31
Payer: MEDICARE

## 2022-08-31 VITALS
WEIGHT: 136.6 LBS | HEIGHT: 65 IN | OXYGEN SATURATION: 96 % | DIASTOLIC BLOOD PRESSURE: 68 MMHG | SYSTOLIC BLOOD PRESSURE: 124 MMHG | TEMPERATURE: 97.6 F | BODY MASS INDEX: 22.76 KG/M2 | HEART RATE: 87 BPM | RESPIRATION RATE: 22 BRPM

## 2022-08-31 DIAGNOSIS — H25.9 AGE-RELATED CATARACT OF BOTH EYES, UNSPECIFIED AGE-RELATED CATARACT TYPE: ICD-10-CM

## 2022-08-31 DIAGNOSIS — H40.9 GLAUCOMA OF BOTH EYES, UNSPECIFIED GLAUCOMA TYPE: ICD-10-CM

## 2022-08-31 DIAGNOSIS — Z01.818 PRE-OP EXAM: Primary | ICD-10-CM

## 2022-08-31 PROCEDURE — 99214 OFFICE O/P EST MOD 30 MIN: CPT | Performed by: NURSE PRACTITIONER

## 2022-08-31 ASSESSMENT — PAIN SCALES - GENERAL: PAINLEVEL: NO PAIN (0)

## 2022-08-31 NOTE — PATIENT INSTRUCTIONS
Pre-operation Instructions:    - Stop Aspirin and NSAIDS (Ibuprofen, Motrin, Advil, Aleve, Naproxen, Naprosyn) 7 days prior to the surgery/procedure or follow surgeon's direction.    - Stop all Vitamins and Herbal Supplements (including Vitamin E, Fish Oil, Omega 3 Fatty Acids, Ginseng, Gingko) 7 days prior to the surgery/procedure or follow surgeon's direction.    - Medications:  Continue your medications the morning of your surgery/procedure.    - If you become sick before your surgery/procedure (such as a fever, cough, congestion, or sore throat) you should call your primary care provider and surgeon.    - Unless given permission by your surgeon, do not eat or drink after midnight in the evening prior to your surgery/procedure.

## 2022-08-31 NOTE — PROGRESS NOTES
[address] [address]  Phone: [phone]  Primary Provider: Jennifer Pena  Pre-op Performing Provider: JENNIFER PENA      PREOPERATIVE EVALUATION:  Today's date: 8/31/2022    Herminia Nowak is a 71 year old female who presents for a preoperative evaluation.    Surgical Information:  Surgery/Procedure:   Cataract removal and glaucoma stent  Surgery Location: [facility]   Surgeon: Manuel Condon MD  Surgery Date: 9/12/22, left and 10/3/2022 right  Time of Surgery: TBD  Where patient plans to recover: At home with family  Fax number for surgical facility: [phone]    Type of Anesthesia Anticipated: Local    Assessment & Plan     The proposed surgical procedure is considered LOW risk.    Pre-op exam    Age-related cataract of both eyes, unspecified age-related cataract type  Reason for surgery    Glaucoma of both eyes, unspecified glaucoma type  Reason for surgery           Risks and Recommendations:  The patient has the following additional risks and recommendations for perioperative complications:   - No identified additional risk factors other than previously addressed    Medication Instructions:  hold fish oil 7 days prior to surgery    RECOMMENDATION:  APPROVAL GIVEN to proceed with proposed procedure, without further diagnostic evaluation.                  {Provider  Link to [facility] Help Grid :[id number]}    Subjective     HPI related to upcoming procedure: She is scheduled for cataract removal and glaucoma stent    Preop Questions 8/24/2022   1. Have you ever had a heart attack or stroke? No   2. Have you ever had surgery on your heart or blood vessels, such as a stent placement, a coronary artery bypass, or surgery on an artery in your head, neck, heart, or legs? No   3. Do you have chest pain with activity? No   4. Do you have a history of  heart failure? No   5. Do you currently have a cold, bronchitis or symptoms of  other infection? No   6. Do you have a cough, shortness of breath, or wheezing? No   7. Do you or anyone in your family have previous history of blood clots? No   8. Do you or does anyone in your family have a serious bleeding problem such as prolonged bleeding following surgeries or cuts? No   9. Have you ever had problems with anemia or been told to take iron pills? No   10. Have you had any abnormal blood loss such as black, tarry or bloody stools, or abnormal vaginal bleeding? No   11. Have you ever had a blood transfusion? No   12. Are you willing to have a blood transfusion if it is medically needed before, during, or after your surgery? Yes   13. Have you or any of your relatives ever had problems with anesthesia? No   14. Do you have sleep apnea, excessive snoring or daytime drowsiness? No   15. Do you have any artifical heart valves or other implanted medical devices like a pacemaker, defibrillator, or continuous glucose monitor? No   16. Do you have artificial joints? No   17. Are you allergic to latex? No       Health Care Directive:  Patient has a Health Care Directive on file      Preoperative Review of :   reviewed - no record of controlled substances prescribed.      Status of Chronic Conditions:  See problem list for active medical problems.  Problems all longstanding and stable, except as noted/documented.  See ROS for pertinent symptoms related to these conditions.      Review of Systems  Constitutional, neuro, ENT, endocrine, pulmonary, cardiac, gastrointestinal, genitourinary, musculoskeletal, integument and psychiatric systems are negative, except as otherwise noted.    Patient Active Problem List    Diagnosis Date Noted     Osteopenia 11/04/2019     Priority: Medium     11/1/19:  DEXA scan showed Lumbar Spine (L1-L2) T-score:  -2.2       Slow transit constipation 10/18/2019     Priority: Medium     History of skin cancer 10/17/2018     Priority: Medium     Sees Dermatology yearly for skin  checks       Non-rheumatic mitral regurgitation 10/31/2016     Priority: Medium     Mild on echo 10/2016 and 11/2019       Non-rheumatic tricuspid valve insufficiency 10/31/2016     Priority: Medium     Mild on echo 10/2016 and 11/2019       Family history of osteoporosis 08/31/2015     Priority: Medium     Advance Care Planning 09/24/2012     Priority: Medium     Advance Care Planning 12/16/2016: Receipt of ACP document:  Received: Health Care Directive which was witnessed or notarized on 10-29-13.  Document previously scanned on 10-19-16.  Validation form completed and sent to be scanned.  Code Status needs to be updated to reflect choices in most recent ACP document. Confirmed/documented designated decision maker(s).  Added by Celina Russell RN Advance Care Planning Liaison with [employer]  Discussed advance care planning with patient; information given to patient to review. 9/24/2012          Hyperlipidemia with target LDL less than 160      Priority: Medium      Past Medical History:   Diagnosis Date     Arthritis of carpometacarpal (CMC) joint of right thumb 10/30/2017     Constipation      Cyst of breast     pt unsure which breast was over 20 years ago     Hemorrhoid      History of skin cancer 10/17/2018     Hyperlipidemia LDL goal < 160      Non-rheumatic mitral regurgitation 10/31/2016     WBC decreased 1/3/2014     WBC decreased      Past Surgical History:   Procedure Laterality Date     COLONOSCOPY  2013    Normal     GYN SURGERY  Approx 1989    Laparoscopy. All ok     PUNC/ASPIR BREAST CYST      pt unsure which breast over 20 years ago     SURGICAL HISTORY OF -       laparoscopy     Current Outpatient Medications   Medication Sig Dispense Refill     atorvastatin (LIPITOR) 40 MG tablet Take 1 tablet (40 mg) by mouth daily 90 tablet 3     Calcium-Phosphorus-Vitamin D (CALCIUM GUMMIES PO)        Docusate Calcium (STOOL SOFTENER PO) Take  by mouth.       latanoprost (XALATAN) 0.005 % ophthalmic  "solution Place 1 drop into both eyes At Bedtime       Multiple Vitamin (MULTIVITAMIN OR) Take  by mouth.       Omega-3 Fatty Acids (FISH OIL PO)        Polyethylene Glycol 3350 (MIRALAX PO) Take by mouth as needed       Calcium Carbonate (CALCIUM 500 PO) Take  by mouth.       Misc Natural Products (GLUCOSAMINE CHONDROITIN ADV PO)          Allergies   Allergen Reactions     Penicillins Rash        Social History     Tobacco Use     Smoking status: Never Smoker     Smokeless tobacco: Never Used   Substance Use Topics     Alcohol use: Yes     Comment: Occasional glass of wine     Family History   Problem Relation Age of Onset     Cancer Mother         skin     Cerebrovascular Disease Mother         Several small strokes, also paternal and maternal     Osteoporosis Mother         Osteopenia     Hypertension Father         Paternal aunts/uncles     Hyperlipidemia Father         Paternal aunts and uncles     Osteoporosis Sister         Sister had to give herself shots     Thyroid Disease Sister         Thyroid irradiated- on medication now     Hyperlipidemia Sister      No Known Problems Daughter      Breast Cancer Sister 67        BRCA negative, lumpectomy in the spring this is her 2nd one     No Known Problems Daughter      Diabetes Maternal Aunt      Breast Cancer Paternal Aunt      Diabetes Maternal Grandmother         Also materials aunts/uncles     Diabetes Other      History   Drug Use No         Objective     /68 (BP Location: Right arm)   Pulse 87   Temp 97.6  F (36.4  C) (Oral)   Resp 22   Ht 1.657 m (5' 5.25\")   Wt 62 kg (136 lb 9.6 oz)   SpO2 96%   BMI 22.56 kg/m      Physical Exam    GENERAL APPEARANCE: healthy, alert and no distress     EYES: EOMI, PERRL     HENT: ear canals and TM's normal and nose and mouth without ulcers or lesions     NECK: no adenopathy, no asymmetry, masses, or scars and thyroid normal to palpation     RESP: lungs clear to auscultation - no rales, rhonchi or wheezes     " CV: regular rates and rhythm, normal S1 S2, no S3 or S4 and no murmur, click or rub     MS: extremities normal- no gross deformities noted, no evidence of inflammation in joints, FROM in all extremities.     SKIN: no suspicious lesions or rashes     NEURO: Normal strength and tone, sensory exam grossly normal, mentation intact and speech normal     PSYCH: mentation appears normal. and affect normal/bright     LYMPHATICS: No cervical adenopathy    Recent Labs   Lab Test 10/27/21  0807 10/12/20  0815   HGB  --  14.4   PLT  --  195    138   POTASSIUM 3.8 4.1   CR 0.72 0.76        Diagnostics:  No labs were ordered during this visit.   No EKG required for low risk surgery (cataract, skin procedure, breast biopsy, etc).    Revised Cardiac Risk Index (RCRI):  The patient has the following serious cardiovascular risks for perioperative complications:   - No serious cardiac risks = 0 points     RCRI Interpretation: 0 points: Class I (very low risk - 0.4% complication rate)           Signed Electronically by: Jennifer Dalton NP  Copy of this evaluation report is provided to requesting physician.

## 2022-09-03 ENCOUNTER — HEALTH MAINTENANCE LETTER (OUTPATIENT)
Age: 72
End: 2022-09-03

## 2022-09-20 ENCOUNTER — TELEPHONE (OUTPATIENT)
Dept: FAMILY MEDICINE | Facility: CLINIC | Age: 72
End: 2022-09-20

## 2022-09-20 DIAGNOSIS — E78.5 HYPERLIPIDEMIA WITH TARGET LDL LESS THAN 160: Primary | ICD-10-CM

## 2022-09-20 DIAGNOSIS — I34.0 NON-RHEUMATIC MITRAL REGURGITATION: ICD-10-CM

## 2022-09-20 DIAGNOSIS — Z13.1 SCREENING FOR DIABETES MELLITUS: ICD-10-CM

## 2022-09-20 DIAGNOSIS — Z85.828 HISTORY OF SKIN CANCER: ICD-10-CM

## 2022-09-20 NOTE — TELEPHONE ENCOUNTER
Reason for Call: Request for an order or referral:    Order or referral being requested: Labs    Date needed: before my next appointment    Has the patient been seen by the PCP for this problem? NO    Additional comments: Patient's physical on 10/28 was switched to the afternoon, and patient requests orders put in for her labs so she can schedule lab order for another day.  Patient will call back to make sure the order is in the system and schedule her lab appointment before her physical.    Phone number Patient can be reached at:  Home number on file [phone] (home)    Best Time:  Before 10/28/22    Can we leave a detailed message on this number?  YES    Call taken on 9/20/2022 at 9:36 AM by Rimma Cantu

## 2022-09-20 NOTE — TELEPHONE ENCOUNTER
Routing to PCP      Pt appointment on 10/28 in afternoon- all HM is due on 10/27.  Patient would like to get labs done before appointment- if agreeable please sign so she can schedule lab only visit (fasting).        Jeanie Metzger RN

## 2022-09-21 NOTE — TELEPHONE ENCOUNTER
RN called and updated patient labs were orderd.    RN assisted patient in scheduling lab appointent.    Jose Cruz Velazquez RN, BSN, PHN  [facility]

## 2022-10-20 ENCOUNTER — MYC MEDICAL ADVICE (OUTPATIENT)
Dept: FAMILY MEDICINE | Facility: CLINIC | Age: 72
End: 2022-10-20

## 2022-10-20 NOTE — TELEPHONE ENCOUNTER
RN replied to patient via [employer]. See message for details.     Will hold open for response    Jose Cruz Velazquez RN, BSN, PHN  [facility]: [place]

## 2022-10-20 NOTE — TELEPHONE ENCOUNTER
RN replied to patient via [employer]. See message for details.     Jose Cruz Velazquez RN, BSN, PHN  [facility]: [place]

## 2022-10-24 ENCOUNTER — LAB (OUTPATIENT)
Dept: LAB | Facility: CLINIC | Age: 72
End: 2022-10-24
Payer: MEDICARE

## 2022-10-24 DIAGNOSIS — I34.0 NON-RHEUMATIC MITRAL REGURGITATION: ICD-10-CM

## 2022-10-24 DIAGNOSIS — E78.5 HYPERLIPIDEMIA WITH TARGET LDL LESS THAN 160: ICD-10-CM

## 2022-10-24 DIAGNOSIS — Z13.1 SCREENING FOR DIABETES MELLITUS: ICD-10-CM

## 2022-10-24 LAB
ANION GAP SERPL CALCULATED.3IONS-SCNC: 4 MMOL/L (ref 3–14)
BASOPHILS # BLD AUTO: 0 10E3/UL (ref 0–0.2)
BASOPHILS NFR BLD AUTO: 0 %
BUN SERPL-MCNC: 20 MG/DL (ref 7–30)
CALCIUM SERPL-MCNC: 10 MG/DL (ref 8.5–10.1)
CHLORIDE BLD-SCNC: 106 MMOL/L (ref 94–109)
CHOLEST SERPL-MCNC: 172 MG/DL
CO2 SERPL-SCNC: 29 MMOL/L (ref 20–32)
CREAT SERPL-MCNC: 0.83 MG/DL (ref 0.52–1.04)
EOSINOPHIL # BLD AUTO: 0.1 10E3/UL (ref 0–0.7)
EOSINOPHIL NFR BLD AUTO: 4 %
ERYTHROCYTE [DISTWIDTH] IN BLOOD BY AUTOMATED COUNT: 12.3 % (ref 10–15)
FASTING STATUS PATIENT QL REPORTED: YES
GFR SERPL CREATININE-BSD FRML MDRD: 75 ML/MIN/1.73M2
GLUCOSE BLD-MCNC: 99 MG/DL (ref 70–99)
HCT VFR BLD AUTO: 43 % (ref 35–47)
HDLC SERPL-MCNC: 47 MG/DL
HGB BLD-MCNC: 14.8 G/DL (ref 11.7–15.7)
LDLC SERPL CALC-MCNC: 90 MG/DL
LYMPHOCYTES # BLD AUTO: 1.4 10E3/UL (ref 0.8–5.3)
LYMPHOCYTES NFR BLD AUTO: 37 %
MCH RBC QN AUTO: 31.4 PG (ref 26.5–33)
MCHC RBC AUTO-ENTMCNC: 34.4 G/DL (ref 31.5–36.5)
MCV RBC AUTO: 91 FL (ref 78–100)
MONOCYTES # BLD AUTO: 0.4 10E3/UL (ref 0–1.3)
MONOCYTES NFR BLD AUTO: 11 %
NEUTROPHILS # BLD AUTO: 1.9 10E3/UL (ref 1.6–8.3)
NEUTROPHILS NFR BLD AUTO: 48 %
NONHDLC SERPL-MCNC: 125 MG/DL
PLATELET # BLD AUTO: 212 10E3/UL (ref 150–450)
POTASSIUM BLD-SCNC: 4.3 MMOL/L (ref 3.4–5.3)
RBC # BLD AUTO: 4.72 10E6/UL (ref 3.8–5.2)
SODIUM SERPL-SCNC: 139 MMOL/L (ref 133–144)
TRIGL SERPL-MCNC: 174 MG/DL
WBC # BLD AUTO: 3.9 10E3/UL (ref 4–11)

## 2022-10-24 PROCEDURE — 85025 COMPLETE CBC W/AUTO DIFF WBC: CPT

## 2022-10-24 PROCEDURE — 36415 COLL VENOUS BLD VENIPUNCTURE: CPT

## 2022-10-24 PROCEDURE — 80048 BASIC METABOLIC PNL TOTAL CA: CPT

## 2022-10-24 PROCEDURE — 80061 LIPID PANEL: CPT

## 2022-10-26 ASSESSMENT — ENCOUNTER SYMPTOMS
MYALGIAS: 0
PALPITATIONS: 0
PARESTHESIAS: 0
HEMATURIA: 0
NAUSEA: 0
SORE THROAT: 0
SHORTNESS OF BREATH: 0
ARTHRALGIAS: 0
HEADACHES: 0
DIARRHEA: 0
HEARTBURN: 0
ABDOMINAL PAIN: 0
COUGH: 0
FEVER: 0
BREAST MASS: 0
WEAKNESS: 0
JOINT SWELLING: 0
DYSURIA: 0
HEMATOCHEZIA: 0
DIZZINESS: 0
CHILLS: 0
CONSTIPATION: 0
NERVOUS/ANXIOUS: 0
FREQUENCY: 0
EYE PAIN: 0

## 2022-10-26 ASSESSMENT — ACTIVITIES OF DAILY LIVING (ADL): CURRENT_FUNCTION: NO ASSISTANCE NEEDED

## 2022-10-28 ENCOUNTER — OFFICE VISIT (OUTPATIENT)
Dept: FAMILY MEDICINE | Facility: CLINIC | Age: 72
End: 2022-10-28
Payer: MEDICARE

## 2022-10-28 VITALS
DIASTOLIC BLOOD PRESSURE: 72 MMHG | BODY MASS INDEX: 22.26 KG/M2 | OXYGEN SATURATION: 98 % | HEART RATE: 83 BPM | WEIGHT: 133.6 LBS | SYSTOLIC BLOOD PRESSURE: 120 MMHG | RESPIRATION RATE: 20 BRPM | TEMPERATURE: 97.7 F | HEIGHT: 65 IN

## 2022-10-28 DIAGNOSIS — Z78.0 ASYMPTOMATIC POSTMENOPAUSAL STATUS: ICD-10-CM

## 2022-10-28 DIAGNOSIS — E78.5 HYPERLIPIDEMIA WITH TARGET LDL LESS THAN 160: ICD-10-CM

## 2022-10-28 DIAGNOSIS — I36.1 NON-RHEUMATIC TRICUSPID VALVE INSUFFICIENCY: ICD-10-CM

## 2022-10-28 DIAGNOSIS — Z23 NEED FOR PROPHYLACTIC VACCINATION AND INOCULATION AGAINST INFLUENZA: ICD-10-CM

## 2022-10-28 DIAGNOSIS — R09.81 NASAL CONGESTION: ICD-10-CM

## 2022-10-28 DIAGNOSIS — I34.0 NON-RHEUMATIC MITRAL REGURGITATION: ICD-10-CM

## 2022-10-28 DIAGNOSIS — Z00.00 ENCOUNTER FOR MEDICARE ANNUAL WELLNESS EXAM: Primary | ICD-10-CM

## 2022-10-28 DIAGNOSIS — M85.80 OSTEOPENIA, UNSPECIFIED LOCATION: ICD-10-CM

## 2022-10-28 PROCEDURE — 90662 IIV NO PRSV INCREASED AG IM: CPT | Performed by: NURSE PRACTITIONER

## 2022-10-28 PROCEDURE — G0439 PPPS, SUBSEQ VISIT: HCPCS | Mod: CS | Performed by: NURSE PRACTITIONER

## 2022-10-28 PROCEDURE — G0008 ADMIN INFLUENZA VIRUS VAC: HCPCS | Performed by: NURSE PRACTITIONER

## 2022-10-28 PROCEDURE — U0005 INFEC AGEN DETEC AMPLI PROBE: HCPCS | Performed by: NURSE PRACTITIONER

## 2022-10-28 PROCEDURE — U0003 INFECTIOUS AGENT DETECTION BY NUCLEIC ACID (DNA OR RNA); SEVERE ACUTE RESPIRATORY SYNDROME CORONAVIRUS 2 (SARS-COV-2) (CORONAVIRUS DISEASE [COVID-19]), AMPLIFIED PROBE TECHNIQUE, MAKING USE OF HIGH THROUGHPUT TECHNOLOGIES AS DESCRIBED BY CMS-2020-01-R: HCPCS | Performed by: NURSE PRACTITIONER

## 2022-10-28 RX ORDER — ATORVASTATIN CALCIUM 40 MG/1
40 TABLET, FILM COATED ORAL DAILY
Qty: 90 TABLET | Refills: 3 | Status: SHIPPED | OUTPATIENT
Start: 2022-10-28 | End: 2023-10-31

## 2022-10-28 ASSESSMENT — ENCOUNTER SYMPTOMS
CONSTIPATION: 0
DIARRHEA: 0
EYE PAIN: 0
SORE THROAT: 0
JOINT SWELLING: 0
ARTHRALGIAS: 0
PALPITATIONS: 0
HEMATURIA: 0
BREAST MASS: 0
SHORTNESS OF BREATH: 0
FEVER: 0
FREQUENCY: 0
NAUSEA: 0
ABDOMINAL PAIN: 0
HEADACHES: 0
DIZZINESS: 0
MYALGIAS: 0
NERVOUS/ANXIOUS: 0
HEARTBURN: 0
COUGH: 0
PARESTHESIAS: 0
CHILLS: 0
WEAKNESS: 0
DYSURIA: 0
HEMATOCHEZIA: 0

## 2022-10-28 ASSESSMENT — ACTIVITIES OF DAILY LIVING (ADL): CURRENT_FUNCTION: NO ASSISTANCE NEEDED

## 2022-10-28 NOTE — PATIENT INSTRUCTIONS
Call to schedule echocardiogram - [facility]:  [phone]      Please call to schedule your bone density screening.  [facility] (xray, mammogram, bone density, and ultrasound) [phone]    Patient Education   Personalized Prevention Plan  You are due for the preventive services outlined below.  Your care team is available to assist you in scheduling these services.  If you have already completed any of these items, please share that information with your care team to update in your medical record.  Health Maintenance Due   Topic Date Due    Hepatitis B Vaccine (1 of 3 - 3-dose series) Never done    Osteoporosis Screening  2021    COVID-19 Vaccine (5 - Booster for Moderna series) 2022    Flu Vaccine (1) 2022    ANNUAL REVIEW OF HM ORDERS  10/27/2022    Annual Wellness Visit  10/27/2022

## 2022-10-28 NOTE — NURSING NOTE
Prior to immunization administration, verified patients identity using patient s name and date of birth. Please see Immunization Activity for additional information.     Screening Questionnaire for Adult Immunization    Are you sick today?   No   Do you have allergies to medications, food, a vaccine component or latex?   No   Have you ever had a serious reaction after receiving a vaccination?   No   Do you have a long-term health problem with heart, lung, kidney, or metabolic disease (e.g., diabetes), asthma, a blood disorder, no spleen, complement component deficiency, a cochlear implant, or a spinal fluid leak?  Are you on long-term aspirin therapy?   No   Do you have cancer, leukemia, HIV/AIDS, or any other immune system problem?   No   Do you have a parent, brother, or sister with an immune system problem?   No   In the past 3 months, have you taken medications that affect  your immune system, such as prednisone, other steroids, or anticancer drugs; drugs for the treatment of rheumatoid arthritis, Crohn s disease, or psoriasis; or have you had radiation treatments?   No   Have you had a seizure, or a brain or other nervous system problem?   No   During the past year, have you received a transfusion of blood or blood    products, or been given immune (gamma) globulin or antiviral drug?   No   For women: Are you pregnant or is there a chance you could become       pregnant during the next month?   No   Have you received any vaccinations in the past 4 weeks?   No     Immunization questionnaire answers were all negative.        Per orders of  Jennifer LMABERT , injection of High Dose Flu  given by Emily Black MA. Patient instructed to remain in clinic for 15 minutes afterwards, and to report any adverse reaction to me immediately.       Screening performed by Emily Black MA on 10/28/2022 at 1:10 PM.

## 2022-10-28 NOTE — PROGRESS NOTES
"SUBJECTIVE:   Herminia is a 72 year old who presents for Preventive Visit.      Patient has been advised of split billing requirements and indicates understanding: Yes  Are you in the first 12 months of your Medicare coverage?  No    Healthy Habits:     In general, how would you rate your overall health?  Good    Frequency of exercise:  2-3 days/week    Duration of exercise:  30-45 minutes    Do you usually eat at least 4 servings of fruit and vegetables a day, include whole grains    & fiber and avoid regularly eating high fat or \"junk\" foods?  Yes    Taking medications regularly:  Yes    Ability to successfully perform activities of daily living:  No assistance needed    Home Safety:  No safety concerns identified    Hearing Impairment:  No hearing concerns    In the past 6 months, have you been bothered by leaking of urine?  No    In general, how would you rate your overall mental or emotional health?  Good      PHQ-2 Total Score: 0    Additional concerns today:  Yes    Positive for Covid 12 days ago.  Started with a bad cold, symptoms are improving but still mild cold symptoms.  Testing every 3 days and still testing positive.  But her symptoms are improving and she has finished the guidelines for quarantine.  Will check PCR Covid test today.    Do you feel safe in your environment? YES    Have you ever done Advance Care Planning? (For example, a Health Directive, POLST, or a discussion with a medical provider or your loved ones about your wishes): Yes, advance care planning is on file.      Fall risk  Fallen 2 or more times in the past year?: No  Any fall with injury in the past year?: No    Cognitive Screening   1) Repeat 3 items (Leader, Season, Table)      2) Clock draw: NORMAL  3) 3 item recall: Recalls 3 objects  Results: NORMAL clock, 1-2 items recalled: COGNITIVE IMPAIRMENT LESS LIKELY    Mini-CogTM Copyright RICHI Resendez. Licensed by the author for use in [facility]; reprinted with permission " (beatriz@[facility]). All rights reserved.      Do you have sleep apnea, excessive snoring or daytime drowsiness?: no    Reviewed and updated as needed this visit by clinical staff                  Reviewed and updated as needed this visit by Provider                 Social History     Tobacco Use     Smoking status: Never     Smokeless tobacco: Never   Substance Use Topics     Alcohol use: Yes     Comment: Occasional glass of wine     If you drink alcohol do you typically have >3 drinks per day or >7 drinks per week? No    Alcohol Use 10/26/2022   Prescreen: >3 drinks/day or >7 drinks/week? No   Prescreen: >3 drinks/day or >7 drinks/week? -   No flowsheet data found.    Current providers sharing in care for this patient include:   Patient Care Team:  Jennifer Dalton NP as PCP - General (Nurse Practitioner - Family)  Jennifer Dalton NP as Assigned PCP    The following health maintenance items are reviewed in Epic and correct as of today:  Health Maintenance   Topic Date Due     HEPATITIS B IMMUNIZATION (1 of 3 - 3-dose series) Never done     DEXA  11/01/2021     COVID-19 Vaccine (5 - Booster for Moderna series) 06/30/2022     INFLUENZA VACCINE (1) 09/01/2022     ANNUAL REVIEW OF HM ORDERS  10/27/2022     MEDICARE ANNUAL WELLNESS VISIT  10/27/2022     MAMMO SCREENING  12/15/2022     COLORECTAL CANCER SCREENING  06/20/2023     LIPID  10/24/2023     FALL RISK ASSESSMENT  10/28/2023     ADVANCE CARE PLANNING  10/27/2026     DTAP/TDAP/TD IMMUNIZATION (3 - Td or Tdap) 10/17/2028     HEPATITIS C SCREENING  Completed     PHQ-2 (once per calendar year)  Completed     Pneumococcal Vaccine: 65+ Years  Completed     ZOSTER IMMUNIZATION  Completed     IPV IMMUNIZATION  Aged Out     MENINGITIS IMMUNIZATION  Aged Out     BP Readings from Last 3 Encounters:   10/28/22 120/72   08/31/22 124/68   10/27/21 110/80    Wt Readings from Last 3 Encounters:   10/28/22 60.6 kg (133 lb 9.6 oz)   08/31/22 62 kg (136 lb 9.6 oz)   10/27/21 60.7  kg (133 lb 12.8 oz)                  Patient Active Problem List   Diagnosis     Advance Care Planning     Hyperlipidemia with target LDL less than 160     Family history of osteoporosis     Non-rheumatic mitral regurgitation     Non-rheumatic tricuspid valve insufficiency     History of skin cancer     Slow transit constipation     Osteopenia     Past Surgical History:   Procedure Laterality Date     COLONOSCOPY  2013    Normal     GYN SURGERY  Approx 1989    Laparoscopy. All ok     PUNC/ASPIR BREAST CYST      pt unsure which breast over 20 years ago     SURGICAL HISTORY OF -       laparoscopy       Social History     Tobacco Use     Smoking status: Never     Smokeless tobacco: Never   Substance Use Topics     Alcohol use: Yes     Comment: Occasional glass of wine     Family History   Problem Relation Age of Onset     Cancer Mother         skin     Cerebrovascular Disease Mother         Several small strokes, also paternal and maternal     Osteoporosis Mother         Osteopenia     Hypertension Father         Paternal aunts/uncles     Hyperlipidemia Father         Paternal aunts and uncles     Osteoporosis Sister         Sister had to give herself shots     Thyroid Disease Sister         Thyroid irradiated- on medication now     Hyperlipidemia Sister      No Known Problems Daughter      Breast Cancer Sister 67        BRCA negative, lumpectomy in the spring this is her 2nd one     No Known Problems Daughter      Diabetes Maternal Aunt      Breast Cancer Paternal Aunt      Diabetes Maternal Grandmother         Also materials aunts/uncles     Diabetes Other          Current Outpatient Medications   Medication Sig Dispense Refill     atorvastatin (LIPITOR) 40 MG tablet Take 1 tablet (40 mg) by mouth daily 90 tablet 3     Calcium-Phosphorus-Vitamin D (CALCIUM GUMMIES PO)        Docusate Calcium (STOOL SOFTENER PO) Take  by mouth.       latanoprost (XALATAN) 0.005 % ophthalmic solution Place 1 drop into both eyes At  "Bedtime       Multiple Vitamin (MULTIVITAMIN OR) Take  by mouth.       Omega-3 Fatty Acids (FISH OIL PO)        Polyethylene Glycol 3350 (MIRALAX PO) Take by mouth as needed       Allergies   Allergen Reactions     Penicillins Rash           Review of Systems   Constitutional: Negative for chills and fever.   HENT: Negative for congestion, ear pain, hearing loss and sore throat.    Eyes: Negative for pain and visual disturbance.   Respiratory: Negative for cough and shortness of breath.    Cardiovascular: Negative for chest pain, palpitations and peripheral edema.   Gastrointestinal: Negative for abdominal pain, constipation, diarrhea, heartburn, hematochezia and nausea.   Breasts:  Negative for tenderness, breast mass and discharge.   Genitourinary: Negative for dysuria, frequency, genital sores, hematuria, pelvic pain, urgency, vaginal bleeding and vaginal discharge.   Musculoskeletal: Negative for arthralgias, joint swelling and myalgias.   Skin: Negative for rash.   Neurological: Negative for dizziness, weakness, headaches and paresthesias.   Psychiatric/Behavioral: Negative for mood changes. The patient is not nervous/anxious.        OBJECTIVE:   There were no vitals taken for this visit. Estimated body mass index is 22.56 kg/m  as calculated from the following:    Height as of 8/31/22: 1.657 m (5' 5.25\").    Weight as of 8/31/22: 62 kg (136 lb 9.6 oz).  Physical Exam  GENERAL APPEARANCE: healthy, alert and no distress  EYES: Eyes grossly normal to inspection, PERRL and conjunctivae and sclerae normal  HENT: ear canals and TM's normal, nose and mouth without ulcers or lesions, oropharynx clear and oral mucous membranes moist  NECK: no adenopathy, no asymmetry, masses, or scars and thyroid normal to palpation  RESP: lungs clear to auscultation - no rales, rhonchi or wheezes  BREAST: normal without masses, tenderness or nipple discharge and no palpable axillary masses or adenopathy  CV: regular rate and rhythm, " "normal S1 S2, no S3 or S4, no murmur, click or rub, no peripheral edema and peripheral pulses strong  ABDOMEN: soft, nontender, no hepatosplenomegaly, no masses and bowel sounds normal  MS: no musculoskeletal defects are noted and gait is age appropriate without ataxia  SKIN: no suspicious lesions or rashes  NEURO: Normal strength and tone, sensory exam grossly normal, mentation intact and speech normal  PSYCH: mentation appears normal and affect normal/bright    Diagnostic Test Results:  Labs reviewed in Epic    ASSESSMENT / PLAN:   Herminia was seen today for physical and imm/inj.    Diagnoses and all orders for this visit:    Encounter for Medicare annual wellness exam    Osteopenia, unspecified location  -     DEXA HIP/PELVIS/SPINE - Future; Future    Hyperlipidemia with target LDL less than 160  -     atorvastatin (LIPITOR) 40 MG tablet; Take 1 tablet (40 mg) by mouth daily    Non-rheumatic mitral regurgitation  -     Echocardiogram Complete; Future    Non-rheumatic tricuspid valve insufficiency  -     Echocardiogram Complete; Future    Nasal congestion  -     Symptomatic; Yes; 10/17/2022 COVID-19 Virus (Coronavirus) by PCR Nose; Future  -     Symptomatic; Yes; 10/17/2022 COVID-19 Virus (Coronavirus) by PCR Nose    Asymptomatic postmenopausal status  -     DEXA HIP/PELVIS/SPINE - Future; Future    Need for prophylactic vaccination and inoculation against influenza    Other orders  -     INFLUENZA, QUAD, HIGH DOSE, PF, 65YR + (FLUZONE HD)          COUNSELING:  Reviewed preventive health counseling, as reflected in patient instructions       Regular exercise       Healthy diet/nutrition    Estimated body mass index is 22.56 kg/m  as calculated from the following:    Height as of 8/31/22: 1.657 m (5' 5.25\").    Weight as of 8/31/22: 62 kg (136 lb 9.6 oz).        She reports that she has never smoked. She has never used smokeless tobacco.      Appropriate preventive services were discussed with this patient, including " applicable screening as appropriate for cardiovascular disease, diabetes, osteopenia/osteoporosis, and glaucoma.  As appropriate for age/gender, discussed screening for colorectal cancer, prostate cancer, breast cancer, and cervical cancer. Checklist reviewing preventive services available has been given to the patient.    Reviewed patients plan of care and provided an AVS. The Basic Care Plan (routine screening as documented in Health Maintenance) for Herminia meets the Care Plan requirement. This Care Plan has been established and reviewed with the Patient.    Counseling Resources:  ATP IV Guidelines  Pooled Cohorts Equation Calculator  Breast Cancer Risk Calculator  Breast Cancer: Medication to Reduce Risk  FRAX Risk Assessment  ICSI Preventive Guidelines  Dietary Guidelines for Americans, 2010  USDA's MyPlate  ASA Prophylaxis  Lung CA Screening    Jennifer Dalton NP  [facility]    Identified Health Risks:

## 2022-10-29 LAB — SARS-COV-2 RNA RESP QL NAA+PROBE: POSITIVE

## 2022-10-30 ENCOUNTER — TELEPHONE (OUTPATIENT)
Dept: NURSING | Facility: CLINIC | Age: 72
End: 2022-10-30

## 2022-10-30 NOTE — TELEPHONE ENCOUNTER
Patient classified as COVID treatment eligible by Epic high risk algorithm:  Yes    Coronavirus (COVID-19) Notification    Reason for call  Notify of POSITIVE COVID-19 lab result, assess symptoms,  review [facility] recommendations    Lab Result   Lab test for 2019-nCoV rRt-PCR or SARS-COV-2 PCR  Oropharyngeal AND/OR nasopharyngeal swabs were POSITIVE for 2019-nCoV RNA [OR] SARS-COV-2 RNA (COVID-19) RNA     We have been unable to reach patient by phone at this time to notify of their Positive COVID-19 result.    Left voicemail message requesting a call back to [phone] [facility] for results.        A Positive COVID-19 letter will be sent via [employer] or the mail.    Carmen Browne

## 2022-11-18 ENCOUNTER — ANCILLARY PROCEDURE (OUTPATIENT)
Dept: BONE DENSITY | Facility: CLINIC | Age: 72
End: 2022-11-18
Attending: NURSE PRACTITIONER
Payer: MEDICARE

## 2022-11-18 ENCOUNTER — ANCILLARY PROCEDURE (OUTPATIENT)
Dept: CARDIOLOGY | Facility: CLINIC | Age: 72
End: 2022-11-18
Attending: NURSE PRACTITIONER
Payer: MEDICARE

## 2022-11-18 DIAGNOSIS — I36.1 NON-RHEUMATIC TRICUSPID VALVE INSUFFICIENCY: ICD-10-CM

## 2022-11-18 DIAGNOSIS — Z78.0 ASYMPTOMATIC POSTMENOPAUSAL STATUS: ICD-10-CM

## 2022-11-18 DIAGNOSIS — M85.80 OSTEOPENIA, UNSPECIFIED LOCATION: ICD-10-CM

## 2022-11-18 DIAGNOSIS — I34.0 NON-RHEUMATIC MITRAL REGURGITATION: ICD-10-CM

## 2022-11-18 LAB — LVEF ECHO: NORMAL

## 2022-11-18 PROCEDURE — 77080 DXA BONE DENSITY AXIAL: CPT | Performed by: INTERNAL MEDICINE

## 2022-11-18 PROCEDURE — 93306 TTE W/DOPPLER COMPLETE: CPT | Performed by: INTERNAL MEDICINE

## 2022-12-19 ENCOUNTER — ANCILLARY PROCEDURE (OUTPATIENT)
Dept: MAMMOGRAPHY | Facility: CLINIC | Age: 72
End: 2022-12-19
Attending: NURSE PRACTITIONER
Payer: MEDICARE

## 2022-12-19 DIAGNOSIS — Z12.31 VISIT FOR SCREENING MAMMOGRAM: ICD-10-CM

## 2022-12-19 PROCEDURE — 77063 BREAST TOMOSYNTHESIS BI: CPT | Mod: TC | Performed by: STUDENT IN AN ORGANIZED HEALTH CARE EDUCATION/TRAINING PROGRAM

## 2022-12-19 PROCEDURE — 77067 SCR MAMMO BI INCL CAD: CPT | Mod: TC | Performed by: STUDENT IN AN ORGANIZED HEALTH CARE EDUCATION/TRAINING PROGRAM

## 2022-12-20 ENCOUNTER — ANCILLARY PROCEDURE (OUTPATIENT)
Dept: MAMMOGRAPHY | Facility: CLINIC | Age: 72
End: 2022-12-20
Attending: NURSE PRACTITIONER
Payer: MEDICARE

## 2022-12-20 DIAGNOSIS — R92.8 ABNORMAL MAMMOGRAM: ICD-10-CM

## 2022-12-20 PROCEDURE — 76642 ULTRASOUND BREAST LIMITED: CPT | Mod: LT

## 2022-12-20 PROCEDURE — 77061 BREAST TOMOSYNTHESIS UNI: CPT | Mod: LT

## 2023-04-14 ENCOUNTER — MYC MEDICAL ADVICE (OUTPATIENT)
Dept: FAMILY MEDICINE | Facility: CLINIC | Age: 73
End: 2023-04-14
Payer: MEDICARE

## 2023-04-14 DIAGNOSIS — Z12.11 SCREEN FOR COLON CANCER: Primary | ICD-10-CM

## 2023-04-17 NOTE — TELEPHONE ENCOUNTER
Routing to PCP    Colonoscopy referral     Rn pended referral- if agreeable     Micaela Gonsalez, RN

## 2023-04-18 NOTE — TELEPHONE ENCOUNTER
Referral to [facility] location signed, as patient went there last time.  Please FAX over the referral and then let patient know.    Jennifer Dalton, DNP, APRN, CNP

## 2023-06-13 ENCOUNTER — TRANSFERRED RECORDS (OUTPATIENT)
Dept: HEALTH INFORMATION MANAGEMENT | Facility: CLINIC | Age: 73
End: 2023-06-13
Payer: MEDICARE

## 2023-06-15 ENCOUNTER — IMMUNIZATION (OUTPATIENT)
Dept: FAMILY MEDICINE | Facility: CLINIC | Age: 73
End: 2023-06-15
Payer: MEDICARE

## 2023-06-15 DIAGNOSIS — Z23 ENCOUNTER FOR IMMUNIZATION: Primary | ICD-10-CM

## 2023-06-15 PROCEDURE — 0134A COVID-19 BIVALENT 18+ (MODERNA): CPT

## 2023-06-15 PROCEDURE — 91313 COVID-19 BIVALENT 18+ (MODERNA): CPT

## 2023-06-15 PROCEDURE — 99207 PR NO CHARGE NURSE ONLY: CPT

## 2023-06-15 NOTE — PROGRESS NOTES
Prior to immunization administration, verified patients identity using patient s name and date of birth. Please see Immunization Activity for additional information.     Screening Questionnaire for Adult Immunization    Are you sick today?   No   Do you have allergies to medications, food, a vaccine component or latex?   No   Have you ever had a serious reaction after receiving a vaccination?   No   Do you have a long-term health problem with heart, lung, kidney, or metabolic disease (e.g., diabetes), asthma, a blood disorder, no spleen, complement component deficiency, a cochlear implant, or a spinal fluid leak?  Are you on long-term aspirin therapy?   No   Do you have cancer, leukemia, HIV/AIDS, or any other immune system problem?   No   Do you have a parent, brother, or sister with an immune system problem?   No   In the past 3 months, have you taken medications that affect  your immune system, such as prednisone, other steroids, or anticancer drugs; drugs for the treatment of rheumatoid arthritis, Crohn s disease, or psoriasis; or have you had radiation treatments?   No   Have you had a seizure, or a brain or other nervous system problem?   No   During the past year, have you received a transfusion of blood or blood    products, or been given immune (gamma) globulin or antiviral drug?   No   For women: Are you pregnant or is there a chance you could become       pregnant during the next month?   No   Have you received any vaccinations in the past 4 weeks?   No     Immunization questionnaire answers were all negative.    I have reviewed the following standing orders:   This patient is due and qualifies for the Covid-19 vaccine.     Click here for COVID-19 Standing Order    I have reviewed the vaccines inclusion and exclusion criteria; No concerns regarding eligibility.     Patient instructed to remain in clinic for 15 minutes afterwards, and to report any adverse reactions.     Screening performed by Karoline LOERA  [employer] on 6/15/2023 at 8:46 AM.

## 2023-06-15 NOTE — NURSING NOTE
Prior to immunization administration, verified patients identity using patient s name and date of birth. Please see Immunization Activity for additional information.     Screening Questionnaire for Adult Immunization    Are you sick today?   No   Do you have allergies to medications, food, a vaccine component or latex?   No   Have you ever had a serious reaction after receiving a vaccination?   No   Do you have a long-term health problem with heart, lung, kidney, or metabolic disease (e.g., diabetes), asthma, a blood disorder, no spleen, complement component deficiency, a cochlear implant, or a spinal fluid leak?  Are you on long-term aspirin therapy?   No   Do you have cancer, leukemia, HIV/AIDS, or any other immune system problem?   No   Do you have a parent, brother, or sister with an immune system problem?   No   In the past 3 months, have you taken medications that affect  your immune system, such as prednisone, other steroids, or anticancer drugs; drugs for the treatment of rheumatoid arthritis, Crohn s disease, or psoriasis; or have you had radiation treatments?   No   Have you had a seizure, or a brain or other nervous system problem?   No   During the past year, have you received a transfusion of blood or blood    products, or been given immune (gamma) globulin or antiviral drug?   No   For women: Are you pregnant or is there a chance you could become       pregnant during the next month?   No   Have you received any vaccinations in the past 4 weeks?   No     Immunization questionnaire answers were all negative.    I have reviewed the following standing orders:   This patient is due and qualifies for the Covid-19 vaccine.     Click here for COVID-19 Standing Order    I have reviewed the vaccines inclusion and exclusion criteria; No concerns regarding eligibility.     Patient instructed to remain in clinic for 15 minutes afterwards, and to report any adverse reactions.     Screening performed by Karoline LOERA  [employer] on 6/15/2023 at 8:35 AM.

## 2023-07-05 ENCOUNTER — TRANSFERRED RECORDS (OUTPATIENT)
Dept: HEALTH INFORMATION MANAGEMENT | Facility: CLINIC | Age: 73
End: 2023-07-05
Payer: MEDICARE

## 2023-10-11 ENCOUNTER — IMMUNIZATION (OUTPATIENT)
Dept: FAMILY MEDICINE | Facility: CLINIC | Age: 73
End: 2023-10-11
Payer: MEDICARE

## 2023-10-11 PROCEDURE — G0008 ADMIN INFLUENZA VIRUS VAC: HCPCS

## 2023-10-11 PROCEDURE — 99207 PR NO CHARGE NURSE ONLY: CPT

## 2023-10-11 PROCEDURE — 90662 IIV NO PRSV INCREASED AG IM: CPT

## 2023-10-16 ENCOUNTER — DOCUMENTATION ONLY (OUTPATIENT)
Dept: FAMILY MEDICINE | Facility: CLINIC | Age: 73
End: 2023-10-16
Payer: MEDICARE

## 2023-10-16 DIAGNOSIS — Z13.1 SCREENING FOR DIABETES MELLITUS: ICD-10-CM

## 2023-10-16 DIAGNOSIS — E78.5 HYPERLIPIDEMIA WITH TARGET LDL LESS THAN 160: Primary | ICD-10-CM

## 2023-10-16 DIAGNOSIS — I34.0 NON-RHEUMATIC MITRAL REGURGITATION: ICD-10-CM

## 2023-10-16 NOTE — PROGRESS NOTES
This patient has a future lab only appointment and needs orders. Please send orders. Thanks [facility]

## 2023-10-24 ENCOUNTER — LAB (OUTPATIENT)
Dept: LAB | Facility: CLINIC | Age: 73
End: 2023-10-24
Payer: MEDICARE

## 2023-10-24 DIAGNOSIS — E78.5 HYPERLIPIDEMIA WITH TARGET LDL LESS THAN 160: ICD-10-CM

## 2023-10-24 DIAGNOSIS — I34.0 NON-RHEUMATIC MITRAL REGURGITATION: ICD-10-CM

## 2023-10-24 DIAGNOSIS — Z13.1 SCREENING FOR DIABETES MELLITUS: ICD-10-CM

## 2023-10-24 LAB
ANION GAP SERPL CALCULATED.3IONS-SCNC: 8 MMOL/L (ref 7–15)
BUN SERPL-MCNC: 18.3 MG/DL (ref 8–23)
CALCIUM SERPL-MCNC: 9.6 MG/DL (ref 8.8–10.2)
CHLORIDE SERPL-SCNC: 102 MMOL/L (ref 98–107)
CHOLEST SERPL-MCNC: 158 MG/DL
CREAT SERPL-MCNC: 0.73 MG/DL (ref 0.51–0.95)
DEPRECATED HCO3 PLAS-SCNC: 28 MMOL/L (ref 22–29)
EGFRCR SERPLBLD CKD-EPI 2021: 87 ML/MIN/1.73M2
ERYTHROCYTE [DISTWIDTH] IN BLOOD BY AUTOMATED COUNT: 11.8 % (ref 10–15)
GLUCOSE SERPL-MCNC: 94 MG/DL (ref 70–99)
HCT VFR BLD AUTO: 43.4 % (ref 35–47)
HDLC SERPL-MCNC: 55 MG/DL
HGB BLD-MCNC: 14.2 G/DL (ref 11.7–15.7)
LDLC SERPL CALC-MCNC: 81 MG/DL
MCH RBC QN AUTO: 30.5 PG (ref 26.5–33)
MCHC RBC AUTO-ENTMCNC: 32.7 G/DL (ref 31.5–36.5)
MCV RBC AUTO: 93 FL (ref 78–100)
NONHDLC SERPL-MCNC: 103 MG/DL
PLATELET # BLD AUTO: 191 10E3/UL (ref 150–450)
POTASSIUM SERPL-SCNC: 4.6 MMOL/L (ref 3.4–5.3)
RBC # BLD AUTO: 4.65 10E6/UL (ref 3.8–5.2)
SODIUM SERPL-SCNC: 138 MMOL/L (ref 135–145)
TRIGL SERPL-MCNC: 112 MG/DL
WBC # BLD AUTO: 4.3 10E3/UL (ref 4–11)

## 2023-10-24 PROCEDURE — 36415 COLL VENOUS BLD VENIPUNCTURE: CPT

## 2023-10-24 PROCEDURE — 85027 COMPLETE CBC AUTOMATED: CPT

## 2023-10-24 PROCEDURE — 80061 LIPID PANEL: CPT

## 2023-10-24 PROCEDURE — 80048 BASIC METABOLIC PNL TOTAL CA: CPT

## 2023-10-24 ASSESSMENT — ENCOUNTER SYMPTOMS
FEVER: 0
JOINT SWELLING: 0
HEARTBURN: 0
NAUSEA: 0
DIZZINESS: 0
PARESTHESIAS: 0
BREAST MASS: 0
COUGH: 0
SHORTNESS OF BREATH: 0
ABDOMINAL PAIN: 0
HEMATURIA: 0
CONSTIPATION: 1
EYE PAIN: 0
MYALGIAS: 0
HEMATOCHEZIA: 0
PALPITATIONS: 0
ARTHRALGIAS: 1
CHILLS: 0
SORE THROAT: 0
DYSURIA: 0
WEAKNESS: 0
FREQUENCY: 0
DIARRHEA: 0
HEADACHES: 0
NERVOUS/ANXIOUS: 0

## 2023-10-24 ASSESSMENT — ACTIVITIES OF DAILY LIVING (ADL): CURRENT_FUNCTION: NO ASSISTANCE NEEDED

## 2023-10-31 ENCOUNTER — OFFICE VISIT (OUTPATIENT)
Dept: FAMILY MEDICINE | Facility: CLINIC | Age: 73
End: 2023-10-31
Payer: MEDICARE

## 2023-10-31 VITALS
WEIGHT: 131 LBS | DIASTOLIC BLOOD PRESSURE: 76 MMHG | RESPIRATION RATE: 16 BRPM | HEIGHT: 65 IN | BODY MASS INDEX: 21.83 KG/M2 | TEMPERATURE: 97.9 F | OXYGEN SATURATION: 98 % | HEART RATE: 70 BPM | SYSTOLIC BLOOD PRESSURE: 122 MMHG

## 2023-10-31 DIAGNOSIS — M85.80 OSTEOPENIA, UNSPECIFIED LOCATION: ICD-10-CM

## 2023-10-31 DIAGNOSIS — Z13.1 SCREENING FOR DIABETES MELLITUS: ICD-10-CM

## 2023-10-31 DIAGNOSIS — Z00.00 ENCOUNTER FOR MEDICARE ANNUAL WELLNESS EXAM: Primary | ICD-10-CM

## 2023-10-31 DIAGNOSIS — E78.5 HYPERLIPIDEMIA WITH TARGET LDL LESS THAN 160: ICD-10-CM

## 2023-10-31 PROCEDURE — 91320 SARSCV2 VAC 30MCG TRS-SUC IM: CPT | Performed by: NURSE PRACTITIONER

## 2023-10-31 PROCEDURE — 90480 ADMN SARSCOV2 VAC 1/ONLY CMP: CPT | Performed by: NURSE PRACTITIONER

## 2023-10-31 PROCEDURE — G0439 PPPS, SUBSEQ VISIT: HCPCS | Performed by: NURSE PRACTITIONER

## 2023-10-31 RX ORDER — ATORVASTATIN CALCIUM 40 MG/1
40 TABLET, FILM COATED ORAL DAILY
Qty: 90 TABLET | Refills: 3 | Status: SHIPPED | OUTPATIENT
Start: 2023-10-31

## 2023-10-31 RX ORDER — RESPIRATORY SYNCYTIAL VIRUS VACCINE 120MCG/0.5
0.5 KIT INTRAMUSCULAR ONCE
Qty: 1 EACH | Refills: 0 | Status: CANCELLED | OUTPATIENT
Start: 2023-10-31 | End: 2023-10-31

## 2023-10-31 ASSESSMENT — ENCOUNTER SYMPTOMS
NAUSEA: 0
DYSURIA: 0
HEMATOCHEZIA: 0
EYE PAIN: 0
FREQUENCY: 0
COUGH: 0
WEAKNESS: 0
HEMATURIA: 0
CONSTIPATION: 1
PARESTHESIAS: 0
HEARTBURN: 0
HEADACHES: 0
ABDOMINAL PAIN: 0
JOINT SWELLING: 0
CHILLS: 0
PALPITATIONS: 0
NERVOUS/ANXIOUS: 0
FEVER: 0
SORE THROAT: 0
SHORTNESS OF BREATH: 0
MYALGIAS: 0
ARTHRALGIAS: 1
DIZZINESS: 0
BREAST MASS: 0
DIARRHEA: 0

## 2023-10-31 ASSESSMENT — ACTIVITIES OF DAILY LIVING (ADL): CURRENT_FUNCTION: NO ASSISTANCE NEEDED

## 2023-10-31 ASSESSMENT — PAIN SCALES - GENERAL: PAINLEVEL: NO PAIN (0)

## 2023-10-31 NOTE — PROGRESS NOTES
"SUBJECTIVE:   Herminia is a 73 year old who presents for Preventive Visit.      10/31/2023     8:18 AM   Additional Questions   Roomed by Kae CURRY       Are you in the first 12 months of your Medicare coverage?  No    Healthy Habits:     In general, how would you rate your overall health?  Good    Frequency of exercise:  4-5 days/week    Duration of exercise:  30-45 minutes    Do you usually eat at least 4 servings of fruit and vegetables a day, include whole grains    & fiber and avoid regularly eating high fat or \"junk\" foods?  Yes    Taking medications regularly:  Yes    Medication side effects:  None    Ability to successfully perform activities of daily living:  No assistance needed    Home Safety:  No safety concerns identified    Hearing Impairment:  No hearing concerns    In the past 6 months, have you been bothered by leaking of urine?  No    In general, how would you rate your overall mental or emotional health?  Good    Additional concerns today:  No      Today's PHQ-2 Score:       10/31/2023     8:05 AM   PHQ-2 ( 1999 [employer])   Q1: Little interest or pleasure in doing things 0   Q2: Feeling down, depressed or hopeless 0   PHQ-2 Score 0   Q1: Little interest or pleasure in doing things Not at all   Q2: Feeling down, depressed or hopeless Not at all   PHQ-2 Score 0           Have you ever done Advance Care Planning? (For example, a Health Directive, POLST, or a discussion with a medical provider or your loved ones about your wishes): Yes, patient states has an Advance Care Planning document and will bring a copy to the clinic.      Fall risk  Fallen 2 or more times in the past year?: No  Any fall with injury in the past year?: No    Cognitive Screening   1) Repeat 3 items (Leader, Season, Table)      2) Clock draw: NORMAL  3) 3 item recall: Recalls 3 objects  Results: 3 items recalled: COGNITIVE IMPAIRMENT LESS LIKELY    Mini-CogTM Copyright S Mal. Licensed by the author for use in [facility]; " reprinted with permission (sojoe@.[facility]). All rights reserved.      Do you have sleep apnea, excessive snoring or daytime drowsiness? : no    Reviewed and updated as needed this visit by clinical staff   Tobacco  Allergies  Meds  Problems  Med Hx  Surg Hx  Fam Hx          Reviewed and updated as needed this visit by Provider    Allergies  Meds  Problems  Med Hx  Surg Hx  Fam Hx         Social History     Tobacco Use    Smoking status: Never    Smokeless tobacco: Never   Substance Use Topics    Alcohol use: Yes     Comment: Occasional glass of wine             10/24/2023     9:49 AM   Alcohol Use   Prescreen: >3 drinks/day or >7 drinks/week? No          No data to display              Do you have a current opioid prescription? No  Do you use any other controlled substances or medications that are not prescribed by a provider? None      Current providers sharing in care for this patient include:   Patient Care Team:  Jennifer Dalton NP as PCP - General (Nurse Practitioner - Family)  Jennifer Dalton NP as Assigned PCP    The following health maintenance items are reviewed in Epic and correct as of today:  Health Maintenance   Topic Date Due    RSV VACCINE 60+ (1 - 1-dose 60+ series) Never done    COVID-19 Vaccine (7 - 2023-24 season) 09/01/2023    MAMMO SCREENING  12/20/2023    LIPID  10/24/2024    MEDICARE ANNUAL WELLNESS VISIT  10/31/2024    ANNUAL REVIEW OF HM ORDERS  10/31/2024    FALL RISK ASSESSMENT  10/31/2024    DEXA  11/18/2025    DTAP/TDAP/TD IMMUNIZATION (3 - Td or Tdap) 10/17/2028    ADVANCE CARE PLANNING  10/31/2028    HEPATITIS C SCREENING  Completed    PHQ-2 (once per calendar year)  Completed    INFLUENZA VACCINE  Completed    Pneumococcal Vaccine: 65+ Years  Completed    ZOSTER IMMUNIZATION  Completed    IPV IMMUNIZATION  Aged Out    HPV IMMUNIZATION  Aged Out    MENINGITIS IMMUNIZATION  Aged Out    COLORECTAL CANCER SCREENING  Discontinued     BP Readings from Last 3 Encounters:    10/31/23 122/76   10/28/22 120/72   08/31/22 124/68    Wt Readings from Last 3 Encounters:   10/31/23 59.4 kg (131 lb)   10/28/22 60.6 kg (133 lb 9.6 oz)   08/31/22 62 kg (136 lb 9.6 oz)                  Patient Active Problem List   Diagnosis    Advance Care Planning    Hyperlipidemia with target LDL less than 160    Family history of osteoporosis    Non-rheumatic mitral regurgitation    Non-rheumatic tricuspid valve insufficiency    History of skin cancer    Slow transit constipation    Osteopenia     Past Surgical History:   Procedure Laterality Date    COLONOSCOPY  2013    Normal    GYN SURGERY  Approx 1989    Laparoscopy. All ok    PUNC/ASPIR BREAST CYST      pt unsure which breast over 20 years ago    SURGICAL HISTORY OF -       laparoscopy       Social History     Tobacco Use    Smoking status: Never    Smokeless tobacco: Never   Substance Use Topics    Alcohol use: Yes     Comment: Occasional glass of wine     Family History   Problem Relation Age of Onset    Cancer Mother         skin    Cerebrovascular Disease Mother         Several small strokes, also paternal and maternal    Osteoporosis Mother         Osteopenia    Hypertension Father         Paternal aunts/uncles    Hyperlipidemia Father         Paternal aunts and uncles    Osteoporosis Sister         Sister had to give herself shots    Thyroid Disease Sister         Thyroid irradiated- on medication now    Hyperlipidemia Sister     No Known Problems Daughter     Breast Cancer Sister 67        BRCA negative, lumpectomy in the spring this is her 2nd one    No Known Problems Daughter     Diabetes Maternal Aunt     Breast Cancer Paternal Aunt     Diabetes Maternal Grandmother         Also materials aunts/uncles    Diabetes Other          Current Outpatient Medications   Medication Sig Dispense Refill    atorvastatin (LIPITOR) 40 MG tablet Take 1 tablet (40 mg) by mouth daily 90 tablet 3    Calcium-Phosphorus-Vitamin D (CALCIUM GUMMIES PO)       Docusate  "Calcium (STOOL SOFTENER PO) Take  by mouth.      doxylamine (UNISOM) 25 MG TABS tablet Take 25 mg by mouth at bedtime 0.5 a tab prn      Multiple Vitamin (MULTIVITAMIN OR) Take  by mouth.      Omega-3 Fatty Acids (FISH OIL PO)       Polyethylene Glycol 3350 (MIRALAX PO) Take by mouth as needed       Allergies   Allergen Reactions    Penicillins Rash     Mammogram Screening: Mammogram Screening - Alternate mammogram schedule due to family history of breast cancer - yearly mammograms 3D        Review of Systems   Constitutional:  Negative for chills and fever.   HENT:  Negative for congestion, ear pain, hearing loss and sore throat.    Eyes:  Negative for pain and visual disturbance.   Respiratory:  Negative for cough and shortness of breath.    Cardiovascular:  Negative for chest pain, palpitations and peripheral edema.   Gastrointestinal:  Positive for constipation. Negative for abdominal pain, diarrhea, heartburn, hematochezia and nausea.   Breasts:  Negative for tenderness, breast mass and discharge.   Genitourinary:  Negative for dysuria, frequency, genital sores, hematuria, pelvic pain, urgency, vaginal bleeding and vaginal discharge.   Musculoskeletal:  Positive for arthralgias. Negative for joint swelling and myalgias.   Skin:  Negative for rash.   Neurological:  Negative for dizziness, weakness, headaches and paresthesias.   Psychiatric/Behavioral:  Negative for mood changes. The patient is not nervous/anxious.        OBJECTIVE:   /76 (BP Location: Right arm, Patient Position: Sitting, Cuff Size: Adult Regular)   Pulse 70   Temp 97.9  F (36.6  C) (Oral)   Resp 16   Ht 1.651 m (5' 5\")   Wt 59.4 kg (131 lb)   SpO2 98%   BMI 21.80 kg/m   Estimated body mass index is 21.8 kg/m  as calculated from the following:    Height as of this encounter: 1.651 m (5' 5\").    Weight as of this encounter: 59.4 kg (131 lb).  Physical Exam  GENERAL APPEARANCE: healthy, alert and no distress  EYES: Eyes grossly normal " to inspection, PERRL and conjunctivae and sclerae normal  HENT: ear canals and TM's normal, nose and mouth without ulcers or lesions, oropharynx clear and oral mucous membranes moist  NECK: no adenopathy, no asymmetry, masses, or scars and thyroid normal to palpation  RESP: lungs clear to auscultation - no rales, rhonchi or wheezes  BREAST: normal without masses, tenderness or nipple discharge and no palpable axillary masses or adenopathy  CV: regular rate and rhythm, normal S1 S2, no S3 or S4, no murmur, click or rub, no peripheral edema and peripheral pulses strong  ABDOMEN: soft, nontender, no hepatosplenomegaly, no masses and bowel sounds normal  SKIN: no suspicious lesions or rashes  NEURO: Normal strength and tone, sensory exam grossly normal, mentation intact and speech normal  PSYCH: mentation appears normal and affect normal/bright    Diagnostic Test Results:  Labs reviewed in Epic    ASSESSMENT / PLAN:   Herminia was seen today for wellness visit.    Diagnoses and all orders for this visit:    Encounter for Medicare annual wellness exam  -     REVIEW OF HEALTH MAINTENANCE PROTOCOL ORDERS  -     COVID-19 12+ (2023-24) (PFIZER)  -     CBC with platelets; Future  -     Vitamin D Deficiency; Future    Hyperlipidemia with target LDL less than 160  -     atorvastatin (LIPITOR) 40 MG tablet; Take 1 tablet (40 mg) by mouth daily  -     Lipid panel reflex to direct LDL Fasting; Future    Screening for diabetes mellitus  -     Basic metabolic panel  (Ca, Cl, CO2, Creat, Gluc, K, Na, BUN); Future    Osteopenia, unspecified location  -     Vitamin D Deficiency; Future    Other orders  -     PRIMARY CARE FOLLOW-UP SCHEDULING; Future      Future labs for 1 year from now ordered.        COUNSELING:  Reviewed preventive health counseling, as reflected in patient instructions       Regular exercise       Healthy diet/nutrition       Osteoporosis prevention/bone health        She reports that she has never smoked. She has never  used smokeless tobacco.      Appropriate preventive services were discussed with this patient, including applicable screening as appropriate for fall prevention, nutrition, physical activity, Tobacco-use cessation, weight loss and cognition.  Checklist reviewing preventive services available has been given to the patient.    Reviewed patients plan of care and provided an AVS. The Basic Care Plan (routine screening as documented in Health Maintenance) for Herminia meets the Care Plan requirement. This Care Plan has been established and reviewed with the Patient.        Jennifer Dalton NP  [facility]    Identified Health Risks:

## 2023-12-20 ENCOUNTER — ANCILLARY PROCEDURE (OUTPATIENT)
Dept: MAMMOGRAPHY | Facility: CLINIC | Age: 73
End: 2023-12-20
Attending: NURSE PRACTITIONER
Payer: MEDICARE

## 2023-12-20 DIAGNOSIS — Z12.31 VISIT FOR SCREENING MAMMOGRAM: ICD-10-CM

## 2023-12-20 PROCEDURE — 77063 BREAST TOMOSYNTHESIS BI: CPT | Mod: TC | Performed by: RADIOLOGY

## 2023-12-20 PROCEDURE — 77067 SCR MAMMO BI INCL CAD: CPT | Mod: TC | Performed by: RADIOLOGY

## 2024-04-02 ENCOUNTER — ANCILLARY PROCEDURE (OUTPATIENT)
Dept: GENERAL RADIOLOGY | Facility: CLINIC | Age: 74
End: 2024-04-02
Attending: NURSE PRACTITIONER
Payer: MEDICARE

## 2024-04-02 ENCOUNTER — OFFICE VISIT (OUTPATIENT)
Dept: FAMILY MEDICINE | Facility: CLINIC | Age: 74
End: 2024-04-02
Payer: MEDICARE

## 2024-04-02 VITALS
TEMPERATURE: 98 F | OXYGEN SATURATION: 98 % | SYSTOLIC BLOOD PRESSURE: 132 MMHG | RESPIRATION RATE: 16 BRPM | HEART RATE: 75 BPM | HEIGHT: 65 IN | DIASTOLIC BLOOD PRESSURE: 80 MMHG | WEIGHT: 136.8 LBS | BODY MASS INDEX: 22.79 KG/M2

## 2024-04-02 DIAGNOSIS — M25.561 RIGHT KNEE PAIN, UNSPECIFIED CHRONICITY: ICD-10-CM

## 2024-04-02 DIAGNOSIS — M25.552 HIP PAIN, LEFT: ICD-10-CM

## 2024-04-02 DIAGNOSIS — M25.552 HIP PAIN, LEFT: Primary | ICD-10-CM

## 2024-04-02 DIAGNOSIS — Z23 NEED FOR VACCINATION: ICD-10-CM

## 2024-04-02 PROCEDURE — 73560 X-RAY EXAM OF KNEE 1 OR 2: CPT | Mod: TC | Performed by: RADIOLOGY

## 2024-04-02 PROCEDURE — 99213 OFFICE O/P EST LOW 20 MIN: CPT | Performed by: NURSE PRACTITIONER

## 2024-04-02 PROCEDURE — 90480 ADMN SARSCOV2 VAC 1/ONLY CMP: CPT | Performed by: NURSE PRACTITIONER

## 2024-04-02 PROCEDURE — 91320 SARSCV2 VAC 30MCG TRS-SUC IM: CPT | Performed by: NURSE PRACTITIONER

## 2024-04-02 PROCEDURE — 73502 X-RAY EXAM HIP UNI 2-3 VIEWS: CPT | Mod: TC | Performed by: RADIOLOGY

## 2024-04-02 ASSESSMENT — PAIN SCALES - GENERAL: PAINLEVEL: NO PAIN (1)

## 2024-04-02 NOTE — PROGRESS NOTES
"  Assessment & Plan     Hip pain, left  XR pelvis and hip left 1 view  - Physical Therapy  Referral; Future    Right knee pain, unspecified chronicity    - XR Knee Standing Right 2 Views; Future  - Physical Therapy  Referral; Future    Need for vaccination    - COVID-19 12+ (2023-24) (PFIZER)    Recommend following up with physical therapy for treatment, if not improving after 6 weeks then would recommend seeing Orthopedic.                  Subjective   Herminia is a 73 year old, presenting for the following health issues:  Pain (Left hip , travels to thigh/Right knee gives out at times  /)        4/2/2024    10:28 AM   Additional Questions   Roomed by Kae CURIEL     History of Present Illness       Reason for visit:  Left hip area soreness and occasionalright knee twinges  Symptom onset:  3-4 weeks ago  Symptoms include:  After walking awhile, left hip area soreness and occasional right knee twinges  Symptom intensity:  Mild  Symptom progression:  Staying the same  Had these symptoms before:  No    She eats 2-3 servings of fruits and vegetables daily.She consumes 1 sweetened beverage(s) daily.She exercises with enough effort to increase her heart rate 20 to 29 minutes per day.  She exercises with enough effort to increase her heart rate 5 days per week.   She is taking medications regularly.     Left hip lateral pain, sore achy, comes and goes.  Does a lot of walking, but walking slower helps the pain.  Family history of osteoarthritis.  Was in [place] during the winter.    Right knee twinges and will \"buckle.\"  Knee is not painful.            Objective    /80 (BP Location: Right arm, Patient Position: Sitting, Cuff Size: Adult Regular)   Pulse 75   Temp 98  F (36.7  C) (Oral)   Resp 16   Ht 1.651 m (5' 5\")   Wt 62.1 kg (136 lb 12.8 oz)   SpO2 98%   BMI 22.76 kg/m    Body mass index is 22.76 kg/m .  Physical Exam   GENERAL: alert and no distress  MS: no tenderness to right knee, no " swelling, full ROM, crepitus present.  Left lateral hip tenderness, good ROM of both hips.  PSYCH: mentation appears normal, affect normal/bright    XR Pelvis and Hip Left 1 View    Result Date: 4/2/2024  XR PELVIS AND HIP LEFT 1 VIEW 4/2/2024 11:41 AM HISTORY: Hip pain, left COMPARISON: None.     IMPRESSION: No fracture or degenerative changes in the left hip. Mild degenerative changes in the right hip. TOMA CURRY MD       XR Knee Standing Right 2 Views    Result Date: 4/2/2024  XR KNEE STANDING RIGHT 2 VIEWS 4/2/2024 11:40 AM HISTORY: Right knee pain, unspecified chronicity COMPARISON: None.     IMPRESSION: No fracture or effusion. No degenerative changes. TOMA CURRY MD        Signed Electronically by: Jennifer Dalton NP

## 2024-04-02 NOTE — PATIENT INSTRUCTIONS
PHYSICAL THERAPY    Physical therapy can:    - Reduce pain  - Improve or restore function and mobility  - Reduce the need for long-term prescription medication use and surgery  - Prevent reinjury  - Maximize physical ability  - Extend independent living  - Improve quality of life    Core strengthening, range of motion, and stretching exercises are important in decreasing pain.  The purpose of physical therapy is to teach you how to do a home exercise program.  The strength/stretch exercises need to be performed every day in order to decrease pain and prevent future occurrences of pain

## 2024-04-02 NOTE — NURSING NOTE
Prior to immunization administration, verified patients identity using patient s name and date of birth. Please see Immunization Activity for additional information.     Screening Questionnaire for Adult Immunization    Are you sick today?   No   Do you have allergies to medications, food, a vaccine component or latex?   No   Have you ever had a serious reaction after receiving a vaccination?   No   Do you have a long-term health problem with heart, lung, kidney, or metabolic disease (e.g., diabetes), asthma, a blood disorder, no spleen, complement component deficiency, a cochlear implant, or a spinal fluid leak?  Are you on long-term aspirin therapy?   No   Do you have cancer, leukemia, HIV/AIDS, or any other immune system problem?   No   Do you have a parent, brother, or sister with an immune system problem?   No   In the past 3 months, have you taken medications that affect  your immune system, such as prednisone, other steroids, or anticancer drugs; drugs for the treatment of rheumatoid arthritis, Crohn s disease, or psoriasis; or have you had radiation treatments?   No   Have you had a seizure, or a brain or other nervous system problem?   No   During the past year, have you received a transfusion of blood or blood    products, or been given immune (gamma) globulin or antiviral drug?   No   For women: Are you pregnant or is there a chance you could become       pregnant during the next month?   No   Have you received any vaccinations in the past 4 weeks?   No     Immunization questionnaire answers were all negative.      Patient instructed to remain in clinic for 15 minutes afterwards, and to report any adverse reactions.     Screening performed by Emily Black MA on 4/2/2024 at 12:02 PM.

## 2024-04-11 ASSESSMENT — ACTIVITIES OF DAILY LIVING (ADL)
STANDING FOR 15 MINUTES: NO DIFFICULTY AT ALL
WALKING_DOWN_STEEP_HILLS: NO DIFFICULTY AT ALL
WALKING_15_MINUTES_OR_GREATER: SLIGHT DIFFICULTY
SQUAT: ACTIVITY IS SOMEWHAT DIFFICULT
RECREATIONAL_ACTIVITIES: SLIGHT DIFFICULTY
SITTING_FOR_15_MINUTES: NO DIFFICULTY AT ALL
HOW_WOULD_YOU_RATE_THE_OVERALL_FUNCTION_OF_YOUR_KNEE_DURING_YOUR_USUAL_DAILY_ACTIVITIES?: NEARLY NORMAL
WALK: ACTIVITY IS NOT DIFFICULT
WALKING_APPROXIMATELY_10_MINUTES: NO DIFFICULTY AT ALL
KNEEL ON THE FRONT OF YOUR KNEE: ACTIVITY IS MINIMALLY DIFFICULT
WALKING_INITIALLY: SLIGHT DIFFICULTY
STEPPING UP AND DOWN CURBS: NO DIFFICULTY AT ALL
WALK: ACTIVITY IS NOT DIFFICULT
HEAVY_WORK: SLIGHT DIFFICULTY
RECREATIONAL ACTIVITIES: SLIGHT DIFFICULTY
DEEP_SQUATTING: MODERATE DIFFICULTY
PUTTING_ON_SOCKS_AND_SHOES: SLIGHT DIFFICULTY
LOW_IMPACT_ACTIVITIES_LIKE_FAST_WALKING: SLIGHT DIFFICULTY
GO UP STAIRS: ACTIVITY IS NOT DIFFICULT
WEAKNESS: THE SYMPTOM AFFECTS MY ACTIVITY SLIGHTLY
SITTING FOR 15 MINUTES: NO DIFFICULTY AT ALL
HEAVY_WORK: SLIGHT DIFFICULTY
SWELLING: I DO NOT HAVE THE SYMPTOM
STIFFNESS: I DO NOT HAVE THE SYMPTOM
LIGHT_TO_MODERATE_WORK: NO DIFFICULTY AT ALL
TWISTING/PIVOTING_ON_INVOLVED_LEG: SLIGHT DIFFICULTY
STEPPING_UP_AND_DOWN_CURBS: NO DIFFICULTY AT ALL
HOW_WOULD_YOU_RATE_YOUR_CURRENT_LEVEL_OF_FUNCTION_DURING_YOUR_USUAL_ACTIVITIES_OF_DAILY_LIVING_FROM_0_TO_100_WITH_100_BEING_YOUR_LEVEL_OF_FUNCTION_PRIOR_TO_YOUR_HIP_PROBLEM_AND_0_BEING_THE_INABILITY_TO_PERFORM_ANY_OF_YOUR_USUAL_DAILY_ACTIVITIES?: 75
PAIN: I DO NOT HAVE THE SYMPTOM
ABILITY_TO_PERFORM_ACTIVITY_WITH_YOUR_NORMAL_TECHNIQUE: SLIGHT DIFFICULTY
SQUAT: ACTIVITY IS SOMEWHAT DIFFICULT
RAW_SCORE: 62
STAND: ACTIVITY IS NOT DIFFICULT
SIT WITH YOUR KNEE BENT: ACTIVITY IS NOT DIFFICULT
LIMPING: I DO NOT HAVE THE SYMPTOM
CUTTING/LATERAL_MOVEMENTS: SLIGHT DIFFICULTY
GO UP STAIRS: ACTIVITY IS NOT DIFFICULT
GETTING_INTO_AND_OUT_OF_A_BATHTUB: SLIGHT DIFFICULTY
SPORTS_SCORE(%): 0
WALKING_FOR_APPROXIMATELY_10_MINUTES: NO DIFFICULTY AT ALL
GOING_UP_1_FLIGHT_OF_STAIRS: NO DIFFICULTY AT ALL
RISE FROM A CHAIR: ACTIVITY IS MINIMALLY DIFFICULT
SWELLING: I DO NOT HAVE THE SYMPTOM
SPORTS_TOTAL_ITEM_SCORE: 0
ADL_COUNT: 17
HOS_ADL_SCORE(%): 86.76
GOING DOWN 1 FLIGHT OF STAIRS: NO DIFFICULTY AT ALL
ADL_SCORE(%): 0
HOW_WOULD_YOU_RATE_YOUR_CURRENT_LEVEL_OF_FUNCTION?: NEARLY NORMAL
ROLLING OVER IN BED: NO DIFFICULTY AT ALL
ADL_TOTAL_ITEM_SCORE: 0
LIMPING: I DO NOT HAVE THE SYMPTOM
SIT WITH YOUR KNEE BENT: ACTIVITY IS NOT DIFFICULT
WALKING_INITIALLY: SLIGHT DIFFICULTY
SPORTS_COUNT: 9
WALKING_UP_STEEP_HILLS: NO DIFFICULTY AT ALL
AS_A_RESULT_OF_YOUR_KNEE_INJURY,_HOW_WOULD_YOU_RATE_YOUR_CURRENT_LEVEL_OF_DAILY_ACTIVITY?: NEARLY NORMAL
GO DOWN STAIRS: ACTIVITY IS NOT DIFFICULT
PAIN: I DO NOT HAVE THE SYMPTOM
PUTTING ON SOCKS AND SHOES: SLIGHT DIFFICULTY
GIVING WAY, BUCKLING OR SHIFTING OF KNEE: THE SYMPTOM AFFECTS MY ACTIVITY SLIGHTLY
RISE FROM A CHAIR: ACTIVITY IS MINIMALLY DIFFICULT
PLEASE_INDICATE_YOR_PRIMARY_REASON_FOR_REFERRAL_TO_THERAPY:: KNEE
GETTING_INTO_AND_OUT_OF_A_BATHTUB: SLIGHT DIFFICULTY
PLEASE_INDICATE_YOR_PRIMARY_REASON_FOR_REFERRAL_TO_THERAPY:: HIP
STAND: ACTIVITY IS NOT DIFFICULT
HOW_WOULD_YOU_RATE_THE_CURRENT_FUNCTION_OF_YOUR_KNEE_DURING_YOUR_USUAL_DAILY_ACTIVITIES_ON_A_SCALE_FROM_0_TO_100_WITH_100_BEING_YOUR_LEVEL_OF_KNEE_FUNCTION_PRIOR_TO_YOUR_INJURY_AND_0_BEING_THE_INABILITY_TO_PERFORM_ANY_OF_YOUR_USUAL_DAILY_ACTIVITIES?: 75
HOW_WOULD_YOU_RATE_YOUR_CURRENT_LEVEL_OF_FUNCTION_DURING_YOUR_USUAL_ACTIVITIES_OF_DAILY_LIVING_FROM_0_TO_100_WITH_100_BEING_YOUR_LEVEL_OF_FUNCTION_PRIOR_TO_YOUR_HIP_PROBLEM_AND_0_BEING_THE_INABILITY_TO_PERFORM_ANY_OF_YOUR_USUAL_DAILY_ACTIVITIES?: 75
ROLLING_OVER_IN_BED: NO DIFFICULTY AT ALL
WEAKNESS: THE SYMPTOM AFFECTS MY ACTIVITY SLIGHTLY
DEEP SQUATTING: MODERATE DIFFICULTY
HOS_ADL_ITEM_SCORE_TOTAL: 59
SPORTS_HIGHEST_POTENTIAL_SCORE: 36
GETTING INTO AND OUT OF AN AVERAGE CAR: SLIGHT DIFFICULTY
GIVING WAY, BUCKLING OR SHIFTING OF KNEE: THE SYMPTOM AFFECTS MY ACTIVITY SLIGHTLY
KNEE_ACTIVITY_OF_DAILY_LIVING_SCORE: 88.57
AS_A_RESULT_OF_YOUR_KNEE_INJURY,_HOW_WOULD_YOU_RATE_YOUR_CURRENT_LEVEL_OF_DAILY_ACTIVITY?: NEARLY NORMAL
STANDING_FOR_15_MINUTES: NO DIFFICULTY AT ALL
GETTING_INTO_AND_OUT_OF_AN_AVERAGE_CAR: SLIGHT DIFFICULTY
LIGHT_TO_MODERATE_WORK: NO DIFFICULTY AT ALL
GOING UP 1 FLIGHT OF STAIRS: NO DIFFICULTY AT ALL
GO DOWN STAIRS: ACTIVITY IS NOT DIFFICULT
KNEEL ON THE FRONT OF YOUR KNEE: ACTIVITY IS MINIMALLY DIFFICULT
ADL_HIGHEST_POTENTIAL_SCORE: 68
WALKING_DOWN_STEEP_HILLS: NO DIFFICULTY AT ALL
KNEE_ACTIVITY_OF_DAILY_LIVING_SUM: 62
STARTING_AND_STOPPING_QUICKLY: SLIGHT DIFFICULTY
GOING_DOWN_1_FLIGHT_OF_STAIRS: NO DIFFICULTY AT ALL
TWISTING/PIVOTING ON INVOLVED LEG: SLIGHT DIFFICULTY
HOW_WOULD_YOU_RATE_THE_CURRENT_FUNCTION_OF_YOUR_KNEE_DURING_YOUR_USUAL_DAILY_ACTIVITIES_ON_A_SCALE_FROM_0_TO_100_WITH_100_BEING_YOUR_LEVEL_OF_KNEE_FUNCTION_PRIOR_TO_YOUR_INJURY_AND_0_BEING_THE_INABILITY_TO_PERFORM_ANY_OF_YOUR_USUAL_DAILY_ACTIVITIES?: 75
HOS_ADL_HIGHEST_POTENTIAL_SCORE: 68
STIFFNESS: I DO NOT HAVE THE SYMPTOM
HOW_WOULD_YOU_RATE_THE_OVERALL_FUNCTION_OF_YOUR_KNEE_DURING_YOUR_USUAL_DAILY_ACTIVITIES?: NEARLY NORMAL
WALKING_UP_STEEP_HILLS: NO DIFFICULTY AT ALL
WALKING_15_MINUTES_OR_GREATER: SLIGHT DIFFICULTY

## 2024-04-18 ENCOUNTER — THERAPY VISIT (OUTPATIENT)
Dept: PHYSICAL THERAPY | Facility: CLINIC | Age: 74
End: 2024-04-18
Attending: NURSE PRACTITIONER
Payer: MEDICARE

## 2024-04-18 DIAGNOSIS — M25.561 RIGHT KNEE PAIN, UNSPECIFIED CHRONICITY: ICD-10-CM

## 2024-04-18 DIAGNOSIS — M25.552 HIP PAIN, LEFT: ICD-10-CM

## 2024-04-18 PROCEDURE — 97110 THERAPEUTIC EXERCISES: CPT | Mod: GP

## 2024-04-18 PROCEDURE — 97161 PT EVAL LOW COMPLEX 20 MIN: CPT | Mod: GP

## 2024-04-18 NOTE — PROGRESS NOTES
PHYSICAL THERAPY EVALUATION  Type of Visit: Evaluation    See electronic medical record for Abuse and Falls Screening details.    Subjective   Pt notes this past winter she experienced a development of some heaviness and soreness in L hip and R knee. She notes no pain in the R knee, but develops aches in L hip as the day goes on.        Presenting condition or subjective complaint: Left hip area muscle weakness, soreness after sustained exercise. Right knee buckling occasionally  Date of onset: (P) 01/19/24 (aproximate start date)    Relevant medical history: Arthritis   Past Medical History:   Diagnosis Date    Arthritis of carpometacarpal (CMC) joint of right thumb 10/30/2017    Constipation     Cyst of breast     pt unsure which breast was over 20 years ago    Hemorrhoid     History of skin cancer 10/17/2018    Hyperlipidemia LDL goal < 160     Non-rheumatic mitral regurgitation 10/31/2016    WBC decreased 1/3/2014    WBC decreased      Dates & types of surgery: None   Past Surgical History:   Procedure Laterality Date    CATARACT EXTRACTION Right 10/03/2022    additionally had Glaukos iStent inject    CATARACT EXTRACTION Left 09/12/2022    additionally had Glaukos iStent inject    COLONOSCOPY  2013    Normal    GYN SURGERY  Approx 1989    Laparoscopy. All ok    PUNC/ASPIR BREAST CYST      pt unsure which breast over 20 years ago    SURGICAL HISTORY OF -       laparoscopy     Prior diagnostic imaging/testing results: X-ray     Prior therapy history for the same diagnosis, illness or injury: No      Living Environment  Social support: With a significant other or spouse   Type of home: House; 2-story   Stairs to enter the home: Yes 1 Is there a railing: No   Ramp: No   Stairs inside the home: Yes 12 Is there a railing: Yes   Help at home: None  Equipment owned: [employer]     Employment: No    Hobbies/Interests: Reading gardening    Patient goals for therapy: Be more carefree in going about daily activities- im very  cautious now       Objective   HIP EVALUATION  POSTURE: WFL  GAIT: + L Trendelenburg sign  ROM:   (Degrees) Left AROM Left PROM  Right AROM Right PROM   Hip Flexion WFL  Mild limitations with anterior hip discomfort    Hip Internal Rotation WFL  Limitations with stretch    Hip External Rotation WFL  Limitations with stretch    Knee Flexion WFL  WFL    Knee Extension WFL  WFL    Lumbar Side glide Mild loss no pain Mild loss no pain   Lumbar Flexion Fingers to shin - low back stretch w/o pain   Lumbar Extension Mild loss no pain   Pain:   End feel:   STRENGTH:  Noted weakness to L hip flexion and R knee extension strength without pain  LE FLEXIBILITY:  Muscle length deficit to L iliopsoas, L TFL, B rectus femoris  FUNCTIONAL TESTS: Double Leg Squat: Anterior knee translation, Knee valgus, Hip internal rotation, and Improper use of glutes/hips  SLS: EO mild deviations but WFL; EC increased hip strategy with LOB at 5 seconds B  PALPATION:  Tenderness to palpation to L TFL    Assessment & Plan   CLINICAL IMPRESSIONS  Medical Diagnosis: (P) Hip pain, left  Right knee pain, unspecified chronicity    Treatment Diagnosis: (P) L hip pain with strength and mobility deficits; R knee strength and motor control deficits   Impression/Assessment: Patient is a 73 year old female with L hip and R knee complaints.  The following significant findings have been identified: Decreased ROM/flexibility, Decreased joint mobility, Decreased strength, Impaired balance, Decreased proprioception, Impaired muscle performance, and Decreased activity tolerance. These impairments interfere with their ability to perform recreational activities as compared to previous level of function.     Clinical Decision Making (Complexity):  Clinical Presentation: Stable/Uncomplicated  Clinical Presentation Rationale: based on medical and personal factors listed in PT evaluation  Clinical Decision Making (Complexity): Low complexity    PLAN OF CARE  Treatment  Interventions:  Interventions: Gait Training, Manual Therapy, Neuromuscular Re-education, Therapeutic Activity, Therapeutic Exercise, Self-Care/Home Management    Long Term Goals     PT Goal 1  Goal Identifier: (P) Walking  Goal Description: (P) Pt will express ability to walk as much as needed without limitations from hip weakness  Rationale: (P) to maximize safety and independence with performance of ADLs and functional tasks  Target Date: (P) 07/17/24      Frequency of Treatment: 1x/week  Duration of Treatment: (P) 3 months    Education Assessment:   Learner/Method: Patient;Demonstration;Pictures/Video;No Barriers to Learning    Risks and benefits of evaluation/treatment have been explained.   Patient/Family/caregiver agrees with Plan of Care.     Evaluation Time:     PT Kittyal, Low Complexity Minutes (45940): (P) 15     Signing Clinician: EITAN FLORES [facility]                                                                                   OUTPATIENT PHYSICAL THERAPY      PLAN OF TREATMENT FOR OUTPATIENT REHABILITATION   Patient's Last Name, First Name, M.IJudy  Herminia Nowak YOB: 1950   Provider's Name   [facility]   Medical Record No.  [id number]     Onset Date: (P) 01/19/24 (aproximate start date)  Start of Care Date: (P) 04/18/24     Medical Diagnosis:  (P) Hip pain, left  Right knee pain, unspecified chronicity      PT Treatment Diagnosis:  (P) L hip pain with strength and mobility deficits; R knee strength and motor control deficits Plan of Treatment  Frequency/Duration: 1x/week/ (P) 3 months    Certification date from (P) 04/18/24 to (P) 07/17/24         See note for plan of treatment details and functional goals     EITAN ANDRADE                         I CERTIFY THE NEED FOR THESE SERVICES FURNISHED UNDER        THIS PLAN OF TREATMENT AND WHILE UNDER MY CARE     (Physician attestation of this document indicates review and  certification of the therapy plan).              Referring Provider:  Jennifer Dalton NP cert: 04/18/24 to 07/17/24    Initial Assessment  See Epic Evaluation- Start of Care Date: (P) 04/18/24

## 2024-04-25 ENCOUNTER — THERAPY VISIT (OUTPATIENT)
Dept: PHYSICAL THERAPY | Facility: CLINIC | Age: 74
End: 2024-04-25
Attending: NURSE PRACTITIONER
Payer: MEDICARE

## 2024-04-25 DIAGNOSIS — M25.552 HIP PAIN, LEFT: Primary | ICD-10-CM

## 2024-04-25 DIAGNOSIS — M25.561 RIGHT KNEE PAIN, UNSPECIFIED CHRONICITY: ICD-10-CM

## 2024-04-25 PROCEDURE — 97110 THERAPEUTIC EXERCISES: CPT | Mod: GP

## 2024-04-29 ENCOUNTER — THERAPY VISIT (OUTPATIENT)
Dept: PHYSICAL THERAPY | Facility: CLINIC | Age: 74
End: 2024-04-29
Payer: MEDICARE

## 2024-04-29 DIAGNOSIS — M25.561 RIGHT KNEE PAIN, UNSPECIFIED CHRONICITY: ICD-10-CM

## 2024-04-29 DIAGNOSIS — M25.552 HIP PAIN, LEFT: Primary | ICD-10-CM

## 2024-04-29 PROCEDURE — 2894A VOIDCORRECT: CPT | Mod: GP

## 2024-04-29 PROCEDURE — 97140 MANUAL THERAPY 1/> REGIONS: CPT | Mod: GP | Performed by: PHYSICAL THERAPIST

## 2024-04-29 PROCEDURE — 97110 THERAPEUTIC EXERCISES: CPT | Mod: GP | Performed by: PHYSICAL THERAPIST

## 2024-05-07 ENCOUNTER — THERAPY VISIT (OUTPATIENT)
Dept: PHYSICAL THERAPY | Facility: CLINIC | Age: 74
End: 2024-05-07
Payer: MEDICARE

## 2024-05-07 DIAGNOSIS — M25.561 RIGHT KNEE PAIN, UNSPECIFIED CHRONICITY: ICD-10-CM

## 2024-05-07 DIAGNOSIS — M25.552 HIP PAIN, LEFT: Primary | ICD-10-CM

## 2024-05-07 PROCEDURE — 97140 MANUAL THERAPY 1/> REGIONS: CPT | Mod: GP

## 2024-05-07 PROCEDURE — 97110 THERAPEUTIC EXERCISES: CPT | Mod: GP

## 2024-05-24 ENCOUNTER — THERAPY VISIT (OUTPATIENT)
Dept: PHYSICAL THERAPY | Facility: CLINIC | Age: 74
End: 2024-05-24
Payer: MEDICARE

## 2024-05-24 DIAGNOSIS — M25.552 HIP PAIN, LEFT: Primary | ICD-10-CM

## 2024-05-24 DIAGNOSIS — M25.561 RIGHT KNEE PAIN, UNSPECIFIED CHRONICITY: ICD-10-CM

## 2024-05-24 PROCEDURE — 97140 MANUAL THERAPY 1/> REGIONS: CPT | Mod: GP

## 2024-05-24 PROCEDURE — 97110 THERAPEUTIC EXERCISES: CPT | Mod: GP

## 2024-05-24 ASSESSMENT — ACTIVITIES OF DAILY LIVING (ADL)
STANDING FOR 15 MINUTES: NO DIFFICULTY AT ALL
ADL_TOTAL_ITEM_SCORE: INCOMPLETE
ADL_COUNT: 1
HOW_WOULD_YOU_RATE_YOUR_CURRENT_LEVEL_OF_FUNCTION_DURING_YOUR_USUAL_ACTIVITIES_OF_DAILY_LIVING_FROM_0_TO_100_WITH_100_BEING_YOUR_LEVEL_OF_FUNCTION_PRIOR_TO_YOUR_HIP_PROBLEM_AND_0_BEING_THE_INABILITY_TO_PERFORM_ANY_OF_YOUR_USUAL_DAILY_ACTIVITIES?: 90
STANDING_FOR_15_MINUTES: NO DIFFICULTY AT ALL
HOS_ADL_HIGHEST_POTENTIAL_SCORE: 4
ADL_SCORE: INCOMPLETE
HOW_WOULD_YOU_RATE_YOUR_CURRENT_LEVEL_OF_FUNCTION_DURING_YOUR_USUAL_ACTIVITIES_OF_DAILY_LIVING_FROM_0_TO_100_WITH_100_BEING_YOUR_LEVEL_OF_FUNCTION_PRIOR_TO_YOUR_HIP_PROBLEM_AND_0_BEING_THE_INABILITY_TO_PERFORM_ANY_OF_YOUR_USUAL_DAILY_ACTIVITIES?: 90
ADL_HIGHEST_POTENTIAL_SCORE: 4
HOS_ADL_ITEM_SCORE_TOTAL: 4

## 2024-05-24 NOTE — PROGRESS NOTES
"    DISCHARGE  Reason for Discharge: Patient has met all goals.  Independent with HEP and not currently limited in physical activity due to hip pain.     Equipment Issued: theraband; PTRx rx    Discharge Plan: Patient to continue home program.  Return to PT PRN    Referring Provider:  Jennifer Dalton     05/24/24 0500   Appointment Info   Signing clinician's name / credentials Jasmyne Schmitz, PT DPT [facility]   Total/Authorized Visits E&T   Visits Used 5   Medical Diagnosis Hip pain, left  Right knee pain, unspecified chronicity   PT Tx Diagnosis L hip pain with strength and mobility deficits; R knee strength and motor control deficits   Quick Adds Certification   Progress Note/Certification   Start of Care Date 04/18/24   Onset of illness/injury or Date of Surgery 01/19/24  (aproximate start date)   Therapy Frequency 1x/week   Predicted Duration 3 months   Certification date from 04/18/24   Certification date to 07/17/24   Progress Note Due Date 05/18/24   Progress Note Completed Date 04/18/24   PT Goal 1   Goal Identifier Walking   Goal Description Pt will express ability to walk as much as needed without limitations from hip weakness   Rationale to maximize safety and independence with performance of ADLs and functional tasks   Goal Progress goal met. \"I walked 3 miles yesterday without issue\"   Target Date 07/17/24   Date Met 05/24/24   Subjective Report   Subjective Report \"I'm really pleased\" I have been feeling good overall. The hip is feeling much better than it was. I'm doing exercises routinely and like pretzel stretch the best. Still some twinges every so often but really not bad.   Objective Measures   Objective Measures Objective Measure 1;Objective Measure 2   Objective Measure 1   Objective Measure strength   Details L hip abduction 4+/5 without pain   Objective Measure 2   Objective Measure Palpation   Details increased tone/tenderness Left glute med, TFL. non-tender piriformis   Treatment Interventions " (PT)   Interventions Therapeutic Procedure/Exercise;Manual Therapy   Therapeutic Procedure/Exercise   Therapeutic Procedures: strength, endurance, ROM, flexibility minutes (81671) 15   PTRx Ther Proc 1 Supine Lumbar Hip Roll   PTRx Ther Proc 1 - Details reviewed with videos - continue per HEP   PTRx Ther Proc 2 Seated Piriformis Stretch   PTRx Ther Proc 2 - Details reviewed with videos - continue per HEP   PTRx Ther Proc 3 Pretzel Stretch   PTRx Ther Proc 3 - Details reviewed with videos - continue per HEP   PTRx Ther Proc 4 Squat   PTRx Ther Proc 4 - Details x10 arms at chest, encouraged to gradually work up to 6# at home (has not added more than 3# yet)   PTRx Ther Proc 5 Hip Abduction Straight Leg Raise   PTRx Ther Proc 5 - Details x15 L. remains fatiguing, good form. discussed self-progressions working up to 3 sets of 15-20   PTRx Ther Proc 6 Standing Hip Flexion   PTRx Ther Proc 6 - Details GTB x10 each leg minor form adjustments ensure not lieeint hip adduct or rotate in   PTRx Ther Proc 7 Standing Hamstring Curl Knee Flexion   PTRx Ther Proc 7 - Details x5 each leg form check, adjusted band positioning   Skilled Intervention instruction/cueing for exercise form, dosage & rationale; to promote independence with home exercises   Patient Response/Progress fatiguing, tolerates well   Therapeutic Activity   PTRx Ther Act 1 Foam Roller Piriformis   PTRx Ther Act 1 - Details reviewed with videos continue per HEP ensure not hitting nerve or over-doing it   PTRx Ther Act 2 Ball Massage   PTRx Ther Act 2 - Details reviewed with videos continue per HEP ensure not hitting nerve or over-doing it   Manual Therapy   Manual Therapy: Mobilization, MFR, MLD, friction massage minutes (72583) 10   Manual Therapy Manual Therapy 2;Manual Therapy 3   Manual Therapy 1 STM- pt sidelying   Manual Therapy 1 - Details moderate pressure TPR to L glute med, piriformis and TFL; with TrP release   Skilled Intervention soft tissue techniques  to decrease pain & review self soft tissue management for home   Patient Response/Progress tender, reports relief overall   Education   Learner/Method Patient;Demonstration;Pictures/Video;No Barriers to Learning   Plan   Home program PTrx on phone   Updates to plan of care d/c with HEP   Plan for next session d/c with HEP   Total Session Time   Timed Code Treatment Minutes 25   Total Treatment Time (sum of timed and untimed services) 25

## 2024-07-03 ENCOUNTER — TRANSFERRED RECORDS (OUTPATIENT)
Dept: HEALTH INFORMATION MANAGEMENT | Facility: CLINIC | Age: 74
End: 2024-07-03
Payer: MEDICARE

## 2024-10-29 ENCOUNTER — LAB (OUTPATIENT)
Dept: LAB | Facility: CLINIC | Age: 74
End: 2024-10-29
Payer: MEDICARE

## 2024-10-29 DIAGNOSIS — E78.5 HYPERLIPIDEMIA WITH TARGET LDL LESS THAN 160: ICD-10-CM

## 2024-10-29 DIAGNOSIS — Z13.1 SCREENING FOR DIABETES MELLITUS: ICD-10-CM

## 2024-10-29 DIAGNOSIS — M85.80 OSTEOPENIA, UNSPECIFIED LOCATION: ICD-10-CM

## 2024-10-29 DIAGNOSIS — Z00.00 ENCOUNTER FOR MEDICARE ANNUAL WELLNESS EXAM: ICD-10-CM

## 2024-10-29 LAB
ANION GAP SERPL CALCULATED.3IONS-SCNC: 8 MMOL/L (ref 7–15)
BUN SERPL-MCNC: 22.6 MG/DL (ref 8–23)
CALCIUM SERPL-MCNC: 9.5 MG/DL (ref 8.8–10.4)
CHLORIDE SERPL-SCNC: 107 MMOL/L (ref 98–107)
CHOLEST SERPL-MCNC: 174 MG/DL
CREAT SERPL-MCNC: 0.8 MG/DL (ref 0.51–0.95)
EGFRCR SERPLBLD CKD-EPI 2021: 78 ML/MIN/1.73M2
ERYTHROCYTE [DISTWIDTH] IN BLOOD BY AUTOMATED COUNT: 11.9 % (ref 10–15)
FASTING STATUS PATIENT QL REPORTED: YES
FASTING STATUS PATIENT QL REPORTED: YES
GLUCOSE SERPL-MCNC: 102 MG/DL (ref 70–99)
HCO3 SERPL-SCNC: 28 MMOL/L (ref 22–29)
HCT VFR BLD AUTO: 42.3 % (ref 35–47)
HDLC SERPL-MCNC: 60 MG/DL
HGB BLD-MCNC: 14.1 G/DL (ref 11.7–15.7)
LDLC SERPL CALC-MCNC: 99 MG/DL
MCH RBC QN AUTO: 31.4 PG (ref 26.5–33)
MCHC RBC AUTO-ENTMCNC: 33.3 G/DL (ref 31.5–36.5)
MCV RBC AUTO: 94 FL (ref 78–100)
NONHDLC SERPL-MCNC: 114 MG/DL
PLATELET # BLD AUTO: 170 10E3/UL (ref 150–450)
POTASSIUM SERPL-SCNC: 4.3 MMOL/L (ref 3.4–5.3)
RBC # BLD AUTO: 4.49 10E6/UL (ref 3.8–5.2)
SODIUM SERPL-SCNC: 143 MMOL/L (ref 135–145)
TRIGL SERPL-MCNC: 74 MG/DL
VIT D+METAB SERPL-MCNC: 78 NG/ML (ref 20–50)
WBC # BLD AUTO: 3.9 10E3/UL (ref 4–11)

## 2024-10-29 PROCEDURE — 80061 LIPID PANEL: CPT

## 2024-10-29 PROCEDURE — 36415 COLL VENOUS BLD VENIPUNCTURE: CPT

## 2024-10-29 PROCEDURE — 80048 BASIC METABOLIC PNL TOTAL CA: CPT

## 2024-10-29 PROCEDURE — 82306 VITAMIN D 25 HYDROXY: CPT | Mod: GZ

## 2024-10-29 PROCEDURE — 85027 COMPLETE CBC AUTOMATED: CPT

## 2024-10-31 SDOH — HEALTH STABILITY: PHYSICAL HEALTH: ON AVERAGE, HOW MANY MINUTES DO YOU ENGAGE IN EXERCISE AT THIS LEVEL?: 30 MIN

## 2024-10-31 SDOH — HEALTH STABILITY: PHYSICAL HEALTH: ON AVERAGE, HOW MANY DAYS PER WEEK DO YOU ENGAGE IN MODERATE TO STRENUOUS EXERCISE (LIKE A BRISK WALK)?: 5 DAYS

## 2024-10-31 ASSESSMENT — SOCIAL DETERMINANTS OF HEALTH (SDOH): HOW OFTEN DO YOU GET TOGETHER WITH FRIENDS OR RELATIVES?: TWICE A WEEK

## 2024-11-05 ENCOUNTER — OFFICE VISIT (OUTPATIENT)
Dept: FAMILY MEDICINE | Facility: CLINIC | Age: 74
End: 2024-11-05
Payer: MEDICARE

## 2024-11-05 VITALS
RESPIRATION RATE: 15 BRPM | OXYGEN SATURATION: 96 % | HEIGHT: 65 IN | DIASTOLIC BLOOD PRESSURE: 80 MMHG | HEART RATE: 72 BPM | SYSTOLIC BLOOD PRESSURE: 120 MMHG | TEMPERATURE: 98.8 F | BODY MASS INDEX: 22.89 KG/M2 | WEIGHT: 137.4 LBS

## 2024-11-05 DIAGNOSIS — Z29.11 NEED FOR VACCINATION AGAINST RESPIRATORY SYNCYTIAL VIRUS: ICD-10-CM

## 2024-11-05 DIAGNOSIS — E78.5 HYPERLIPIDEMIA WITH TARGET LDL LESS THAN 160: ICD-10-CM

## 2024-11-05 DIAGNOSIS — Z00.00 ENCOUNTER FOR MEDICARE ANNUAL WELLNESS EXAM: ICD-10-CM

## 2024-11-05 DIAGNOSIS — E55.9 VITAMIN D DEFICIENCY: ICD-10-CM

## 2024-11-05 DIAGNOSIS — M85.80 OSTEOPENIA, UNSPECIFIED LOCATION: Primary | ICD-10-CM

## 2024-11-05 DIAGNOSIS — R73.09 ELEVATED GLUCOSE: ICD-10-CM

## 2024-11-05 LAB
EST. AVERAGE GLUCOSE BLD GHB EST-MCNC: 105 MG/DL
HBA1C MFR BLD: 5.3 % (ref 0–5.6)

## 2024-11-05 PROCEDURE — 99213 OFFICE O/P EST LOW 20 MIN: CPT | Mod: 25 | Performed by: NURSE PRACTITIONER

## 2024-11-05 PROCEDURE — G0439 PPPS, SUBSEQ VISIT: HCPCS | Performed by: NURSE PRACTITIONER

## 2024-11-05 PROCEDURE — 36415 COLL VENOUS BLD VENIPUNCTURE: CPT | Performed by: NURSE PRACTITIONER

## 2024-11-05 PROCEDURE — 83036 HEMOGLOBIN GLYCOSYLATED A1C: CPT | Performed by: NURSE PRACTITIONER

## 2024-11-05 RX ORDER — MULTIVIT WITH MINERALS/LUTEIN
1 TABLET ORAL DAILY
COMMUNITY
Start: 2020-07-06

## 2024-11-05 RX ORDER — ATORVASTATIN CALCIUM 40 MG/1
40 TABLET, FILM COATED ORAL DAILY
Qty: 90 TABLET | Refills: 4 | Status: SHIPPED | OUTPATIENT
Start: 2024-11-05

## 2024-11-05 ASSESSMENT — PAIN SCALES - GENERAL: PAINLEVEL_OUTOF10: NO PAIN (0)

## 2024-11-05 NOTE — PROGRESS NOTES
Preventive Care Visit  [facility]  Jennifer Dalton NP, Nurse Practitioner - Family  2024      Assessment & Plan     Encounter for Medicare annual wellness exam  Recommend getting Tdap booster at the pharmacy because she will be around  grandchild soon and pertussis coverage is recommended.  - Basic metabolic panel  (Ca, Cl, CO2, Creat, Gluc, K, Na, BUN); Future  - CBC with platelets and differential; Future  - CBC with platelets and differential; Future  - Lipid panel reflex to direct LDL Fasting; Future  - Vitamin D Deficiency; Future    Need for vaccination against respiratory syncytial virus  Recommend getting at the pharmacy due to insurance coverage.    Elevated glucose  Check HgbA1C today.  - Hemoglobin A1c; Future  - Basic metabolic panel  (Ca, Cl, CO2, Creat, Gluc, K, Na, BUN); Future  - Hemoglobin A1c    Hyperlipidemia with target LDL less than 160  Known issue that I take into account for their medical decisions, no current exacerbations or new concerns.   - Lipid panel reflex to direct LDL Fasting; Future  - atorvastatin (LIPITOR) 40 MG tablet; Take 1 tablet (40 mg) by mouth daily.    Osteopenia, unspecified location    - Basic metabolic panel  (Ca, Cl, CO2, Creat, Gluc, K, Na, BUN); Future  - Vitamin D Deficiency; Future    Vitamin D deficiency    - Vitamin D Deficiency; Future    Labs prior to next year physical ordered.        Counseling  Appropriate preventive services were addressed with this patient via screening, questionnaire, or discussion as appropriate for fall prevention, nutrition, physical activity, Tobacco-use cessation, social engagement, weight loss and cognition.  Checklist reviewing preventive services available has been given to the patient.  Reviewed patient's diet, addressing concerns and/or questions.   Information on urinary incontinence and treatment options given to patient.           Alfie Hope is a 74 year old, presenting for  the following:  Wellness Visit and discuss RV vaccine        11/5/2024     7:40 AM   Additional Questions   Roomed by Kae TEJEDA    Daughter is pregnant.  That daughter donated a kidney to aunt.   Daughter is 40 years old, will have a boy!    Health Care Directive  Patient has a Health Care Directive on file  Advance care planning document is on file and is current.      10/31/2024   General Health   How would you rate your overall physical health? Good   Feel stress (tense, anxious, or unable to sleep) To some extent      (!) STRESS CONCERN      10/31/2024   Nutrition   Diet: Regular (no restrictions)            10/31/2024   Exercise   Days per week of moderate/strenous exercise 5 days   Average minutes spent exercising at this level 30 min            10/31/2024   Social Factors   Frequency of gathering with friends or relatives Twice a week   Worry food won't last until get money to buy more No   Food not last or not have enough money for food? No   Do you have housing? (Housing is defined as stable permanent housing and does not include staying ouside in a car, in a tent, in an abandoned building, in an overnight shelter, or couch-surfing.) Yes   Are you worried about losing your housing? No   Lack of transportation? No   Unable to get utilities (heat,electricity)? No            10/31/2024   Fall Risk   Fallen 2 or more times in the past year? No    Trouble with walking or balance? No        Patient-reported          10/31/2024   Activities of Daily Living- Home Safety   Needs help with the following daily activites None of the above   Safety concerns in the home None of the above            10/31/2024   Dental   Dentist two times every year? Yes            10/31/2024   Hearing Screening   Hearing concerns? None of the above            10/31/2024   Driving Risk Screening   Patient/family members have concerns about driving No            10/31/2024   General Alertness/Fatigue Screening   Have you been  more tired than usual lately? No            10/31/2024   Urinary Incontinence Screening   Bothered by leaking urine in past 6 months Yes            10/31/2024   TB Screening   Were you born outside of the [place]? No            Today's PHQ-2 Score:       11/5/2024     7:33 AM   PHQ-2 ( 1999 Pfizer)   Q1: Little interest or pleasure in doing things 0    Q2: Feeling down, depressed or hopeless 0    PHQ-2 Score 0    Q1: Little interest or pleasure in doing things Not at all   Q2: Feeling down, depressed or hopeless Not at all   PHQ-2 Score 0       Patient-reported           10/31/2024   Substance Use   Alcohol more than 3/day or more than 7/wk No   Do you have a current opioid prescription? No   How severe/bad is pain from 1 to 10? 1/10   Do you use any other substances recreationally? No        Social History     Tobacco Use    Smoking status: Never    Smokeless tobacco: Never   Vaping Use    Vaping status: Never Used   Substance Use Topics    Alcohol use: Yes     Comment: Occasional glass of wine    Drug use: No           12/20/2023   LAST FHS-7 RESULTS   1st degree relative breast or ovarian cancer Yes   Any relative bilateral breast cancer No   Any male have breast cancer No   Any ONE woman have BOTH breast AND ovarian cancer No   Any woman with breast cancer before 50yrs No   2 or more relatives with breast AND/OR ovarian cancer No   2 or more relatives with breast AND/OR bowel cancer No           Mammogram Screening - Mammogram every 1-2 years updated in Health Maintenance based on mutual decision making    ASCVD Risk   The 10-year ASCVD risk score (Kiara DEVINE, et al., 2019) is: 10%    Values used to calculate the score:      Age: 74 years      Sex: Female      Is Non- : No      Diabetic: No      Tobacco smoker: No      Systolic Blood Pressure: 120 mmHg      Is BP treated: No      HDL Cholesterol: 60 mg/dL      Total Cholesterol: 174 mg/dL            Reviewed and updated as needed this  visit by Provider     Meds   Med Hx  Surg Hx  Fam Hx            BP Readings from Last 3 Encounters:   11/05/24 120/80   04/02/24 132/80   10/31/23 122/76    Wt Readings from Last 3 Encounters:   11/05/24 62.3 kg (137 lb 6.4 oz)   04/02/24 62.1 kg (136 lb 12.8 oz)   10/31/23 59.4 kg (131 lb)                  Patient Active Problem List   Diagnosis    Hyperlipidemia with target LDL less than 160    Family history of osteoporosis    Non-rheumatic mitral regurgitation    Non-rheumatic tricuspid valve insufficiency    History of skin cancer    Slow transit constipation    Osteopenia     Past Surgical History:   Procedure Laterality Date    CATARACT EXTRACTION Right 10/03/2022    additionally had Glaukos iStent inject    CATARACT EXTRACTION Left 09/12/2022    additionally had Glaukos iStent inject    COLONOSCOPY  2013    Normal    EYE SURGERY  9/22  10/22    Cataract surgery.  YAG capsulatomy on both eyes in 2024 to remove scar tissue growing over new lens. Also had a treatment for glaucoma - selective laser trabeculoplasty (SLT) to improve fluid drainage in 2024.    GYN SURGERY  Approx 1989    Laparoscopy. All ok    PUNC/ASPIR BREAST CYST      pt unsure which breast over 20 years ago    SURGICAL HISTORY OF -       laparoscopy       Social History     Tobacco Use    Smoking status: Never    Smokeless tobacco: Never   Substance Use Topics    Alcohol use: Yes     Comment: Occasional glass of wine     Family History   Problem Relation Age of Onset    Cancer Mother         skin    Cerebrovascular Disease Mother         Several small strokes, also paternal and maternal    Osteoporosis Mother         Osteopenia    Hypertension Father         Paternal aunts/uncles    Hyperlipidemia Father         Paternal aunts and uncles    Osteoporosis Sister         Sister had to give herself shots    Thyroid Disease Sister         Thyroid irradiated- on medication now    Hyperlipidemia Sister     No Known Problems Daughter     Breast  Cancer Sister 67        BRCA negative, lumpectomy in the spring this is her 2nd one    No Known Problems Daughter     Diabetes Maternal Aunt     Breast Cancer Paternal Aunt     Diabetes Maternal Grandmother         Also materials aunts/uncles    Diabetes Other          Current Outpatient Medications   Medication Sig Dispense Refill    atorvastatin (LIPITOR) 40 MG tablet Take 1 tablet (40 mg) by mouth daily. 90 tablet 4    Calcium-Phosphorus-Vitamin D (CALCIUM GUMMIES PO)       CRANBERRY EXTRACT PO Take by mouth daily. 1 100mg gummy 2 times a day      Docusate Calcium (STOOL SOFTENER PO) Take  by mouth.      Multiple Vitamin (MULTIVITAMIN OR) Take  by mouth.      Omega-3 Fatty Acids (FISH OIL PO)       Polyethylene Glycol 3350 (MIRALAX PO) Take by mouth as needed      vitamin C (ASCORBIC ACID) 1000 MG TABS Take 1 tablet by mouth daily.       Allergies   Allergen Reactions    Penicillins Rash     Current providers sharing in care for this patient include:  Patient Care Team:  Jennifer Dalton NP as PCP - General (Nurse Practitioner - Family)  Jennifer Dalton NP as Assigned PCP    The following health maintenance items are reviewed in Epic and correct as of today:  Health Maintenance   Topic Date Due    RSV VACCINE (1 - Risk 60-74 years 1-dose series) Never done    MAMMO SCREENING  12/20/2024    LIPID  10/29/2025    MEDICARE ANNUAL WELLNESS VISIT  11/05/2025    ANNUAL REVIEW OF HM ORDERS  11/05/2025    FALL RISK ASSESSMENT  11/05/2025    DEXA  11/18/2025    GLUCOSE  10/29/2027    DTAP/TDAP/TD IMMUNIZATION (3 - Td or Tdap) 10/17/2028    ADVANCE CARE PLANNING  11/05/2029    HEPATITIS C SCREENING  Completed    PHQ-2 (once per calendar year)  Completed    INFLUENZA VACCINE  Completed    Pneumococcal Vaccine: 65+ Years  Completed    ZOSTER IMMUNIZATION  Completed    COVID-19 Vaccine  Completed    HPV IMMUNIZATION  Aged Out    MENINGITIS IMMUNIZATION  Aged Out    RSV MONOCLONAL ANTIBODY  Aged Out    COLORECTAL CANCER  "SCREENING  Discontinued            Objective    Exam  /80 (BP Location: Right arm, Patient Position: Sitting, Cuff Size: Adult Regular)   Pulse 72   Temp 98.8  F (37.1  C) (Oral)   Resp 15   Ht 1.651 m (5' 5\")   Wt 62.3 kg (137 lb 6.4 oz)   SpO2 96%   BMI 22.86 kg/m     Estimated body mass index is 22.86 kg/m  as calculated from the following:    Height as of this encounter: 1.651 m (5' 5\").    Weight as of this encounter: 62.3 kg (137 lb 6.4 oz).    Physical Exam  GENERAL: alert and no distress  EYES: Eyes grossly normal to inspection, PERRL and conjunctivae and sclerae normal  HENT: ear canals and TM's normal, nose and mouth without ulcers or lesions  NECK: no adenopathy, no asymmetry, masses, or scars  RESP: lungs clear to auscultation - no rales, rhonchi or wheezes  BREAST: normal without masses, tenderness or nipple discharge and no palpable axillary masses or adenopathy  CV: regular rate and rhythm, normal S1 S2, no S3 or S4, no murmur, click or rub, no peripheral edema  ABDOMEN: soft, nontender  MS: no gross musculoskeletal defects noted, no edema  SKIN: no suspicious lesions or rashes  NEURO: Normal strength and tone, mentation intact and speech normal  PSYCH: mentation appears normal, affect normal/bright        11/5/2024   Mini Cog   Clock Draw Score 2 Normal   3 Item Recall 3 objects recalled   Mini Cog Total Score 5                 Signed Electronically by: Jennifer Dalton NP    "

## 2024-11-05 NOTE — PATIENT INSTRUCTIONS
Your last Tetanus booster was in 2018 but it was only the Td.    Since you are expecting a  in your close household, recommend getting the Tdap booster (that has the Pertussis component)    All closet caregivers of the baby should get the Tdap.    Get the RSV and the Tdap at a Pharmacy      Patient Education   Preventive Care Advice   This is general advice given by our system to help you stay healthy. However, your care team may have specific advice just for you. Please talk to your care team about your preventive care needs.  Nutrition  Eat 5 or more servings of fruits and vegetables each day.  Try wheat bread, brown rice and whole grain pasta (instead of white bread, rice, and pasta).  Get enough calcium and vitamin D. Check the label on foods and aim for 100% of the RDA (recommended daily allowance).  Lifestyle  Exercise at least 150 minutes each week  (30 minutes a day, 5 days a week).  Do muscle strengthening activities 2 days a week. These help control your weight and prevent disease.  No smoking.  Wear sunscreen to prevent skin cancer.  Have a dental exam and cleaning every 6 months.  Yearly exams  See your health care team every year to talk about:  Any changes in your health.  Any medicines your care team has prescribed.  Preventive care, family planning, and ways to prevent chronic diseases.  Shots (vaccines)   HPV shots (up to age 26), if you've never had them before.  Hepatitis B shots (up to age 59), if you've never had them before.  COVID-19 shot: Get this shot when it's due.  Flu shot: Get a flu shot every year.  Tetanus shot: Get a tetanus shot every 10 years.  Pneumococcal, hepatitis A, and RSV shots: Ask your care team if you need these based on your risk.  Shingles shot (for age 50 and up)  General health tests  Diabetes screening:  Starting at age 35, Get screened for diabetes at least every 3 years.  If you are younger than age 35, ask your care team if you should be screened for  diabetes.  Cholesterol test: At age 39, start having a cholesterol test every 5 years, or more often if advised.  Bone density scan (DEXA): At age 50, ask your care team if you should have this scan for osteoporosis (brittle bones).  Hepatitis C: Get tested at least once in your life.  STIs (sexually transmitted infections)  Before age 24: Ask your care team if you should be screened for STIs.  After age 24: Get screened for STIs if you're at risk. You are at risk for STIs (including HIV) if:  You are sexually active with more than one person.  You don't use condoms every time.  You or a partner was diagnosed with a sexually transmitted infection.  If you are at risk for HIV, ask about PrEP medicine to prevent HIV.  Get tested for HIV at least once in your life, whether you are at risk for HIV or not.  Cancer screening tests  Cervical cancer screening: If you have a cervix, begin getting regular cervical cancer screening tests starting at age 21.  Breast cancer scan (mammogram): If you've ever had breasts, begin having regular mammograms starting at age 40. This is a scan to check for breast cancer.  Colon cancer screening: It is important to start screening for colon cancer at age 45.  Have a colonoscopy test every 10 years (or more often if you're at risk) Or, ask your provider about stool tests like a FIT test every year or Cologuard test every 3 years.  To learn more about your testing options, visit:   .  For help making a decision, visit:   https://bit.ly/hl69426.  Prostate cancer screening test: If you have a prostate, ask your care team if a prostate cancer screening test (PSA) at age 55 is right for you.  Lung cancer screening: If you are a current or former smoker ages 50 to 80, ask your care team if ongoing lung cancer screenings are right for you.  For informational purposes only. Not to replace the advice of your health care provider. Copyright   2023 [employer]. All rights reserved.  Clinically reviewed by the [facility] Transitions Program. MADS 979669 - REV 01/24.  Bladder Training: Care Instructions  Your Care Instructions     Bladder training is used to treat urge incontinence and stress incontinence. Urge incontinence means that the need to urinate comes on so fast that you can't get to a toilet in time. Stress incontinence means that you leak urine because of pressure on your bladder. For example, it may happen when you laugh, cough, or lift something heavy.  Bladder training can increase how long you can wait before you have to urinate. It can also help your bladder hold more urine. And it can give you better control over the urge to urinate.  It is important to remember that bladder training takes a few weeks to a few months to make a difference. You may not see results right away, but don't give up.  Follow-up care is a key part of your treatment and safety. Be sure to make and go to all appointments, and call your doctor if you are having problems. It's also a good idea to know your test results and keep a list of the medicines you take.  How can you care for yourself at home?  Work with your doctor to come up with a bladder training program that is right for you. You may use one or more of the following methods.  Delayed urination  In the beginning, try to keep from urinating for 5 minutes after you first feel the need to go.  While you wait, take deep, slow breaths to relax. Kegel exercises can also help you delay the need to go to the bathroom.  After some practice, when you can easily wait 5 minutes to urinate, try to wait 10 minutes before you urinate.  Slowly increase the waiting period until you are able to control when you have to urinate.  Scheduled urination  Empty your bladder when you first wake up in the morning.  Schedule times throughout the day when you will urinate.  Start by going to the bathroom every hour, even if you don't need to go.  Slowly increase  "the time between trips to the bathroom.  When you have found a schedule that works well for you, keep doing it.  If you wake up during the night and have to urinate, do it. Apply your schedule to waking hours only.  Kegel exercises  These tighten and strengthen pelvic muscles, which can help you control the flow of urine. (If doing these exercises causes pain, stop doing them and talk with your doctor.) To do Kegel exercises:  Squeeze your muscles as if you were trying not to pass gas. Or squeeze your muscles as if you were stopping the flow of urine. Your belly, legs, and buttocks shouldn't move.  Hold the squeeze for 3 seconds, then relax for 5 to 10 seconds.  Start with 3 seconds, then add 1 second each week until you are able to squeeze for 10 seconds.  Repeat the exercise 10 times a session. Do 3 to 8 sessions a day.  When should you call for help?  Watch closely for changes in your health, and be sure to contact your doctor if:    Your incontinence is getting worse.     You do not get better as expected.   Where can you learn more?  Go to https://www.Can[employer].net/patiented  Enter V684 in the search box to learn more about \"Bladder Training: Care Instructions.\"  Current as of: November 15, 2023  Content Version: 14.2 2024 [facility] Products and Services.   Care instructions adapted under license by your healthcare professional. If you have questions about a medical condition or this instruction, always ask your healthcare professional. [employer] disclaims any warranty or liability for your use of this information.       "

## 2024-12-16 ENCOUNTER — TELEPHONE (OUTPATIENT)
Dept: FAMILY MEDICINE | Facility: CLINIC | Age: 74
End: 2024-12-16
Payer: MEDICARE

## 2024-12-16 DIAGNOSIS — U07.1 INFECTION DUE TO 2019 NOVEL CORONAVIRUS: Primary | ICD-10-CM

## 2024-12-16 NOTE — TELEPHONE ENCOUNTER
RN COVID TREATMENT VISIT  12/16/24      The patient has been triaged and does not require a higher level of care.    Herminia Nowak  74 year old  Current weight? 135 lbs    Has the patient been seen by a primary care provider at an [facility] or [facility] within the past two years? Yes.   Have you been in close proximity to/do you have a known exposure to a person with a confirmed case of influenza? No.     General treatment eligibility:  Date of positive COVID test (PCR or at home)?  12/16    Are you or have you been hospitalized for this COVID-19 infection? No.   Have you received monoclonal antibodies or antiviral treatment for COVID-19 since this positive test? No.   Do you have any of the following conditions that place you at risk of being very sick from COVID-19?   - Age 50 years or older  Yes, patient has at least one high risk condition as noted above.     Current COVID symptoms:   - cough  - headache  - congestion or runny nose  Yes. Patient has at least one symptom as selected.     How many days since symptoms started? 5 days or less. Established patient, 12 years or older weighing at least 88.2 lbs, who has symptoms that started in the past 5 days, has not been hospitalized nor received treatment already, and is at risk for being very sick from COVID-19.     Treatment eligibility by RN:  Are you currently pregnant or nursing? No  Do you have a clinically significant hypersensitivity to nirmatrelvir or ritonavir, or toxic epidermal necrolysis (TEN) or Cintron-Drew Syndrome? No  Do you have a history of hepatitis, any hepatic impairment on the Problem List (such as Child-Brandon Class C, cirrhosis, fatty liver disease, alcoholic liver disease), or was the last liver lab (hepatic panel, ALT, AST, ALK Phos, bilirubin) elevated in the past 6 months? No  Do you have any history of severe renal impairment (eGFR < 30mL/min)? No    Is patient eligible to continue? Yes, patient meets  all eligibility requirements for the RN COVID treatment (as denoted by all no responses above).     Current Outpatient Medications   Medication Sig Dispense Refill    atorvastatin (LIPITOR) 40 MG tablet Take 1 tablet (40 mg) by mouth daily. 90 tablet 4    Calcium-Phosphorus-Vitamin D (CALCIUM GUMMIES PO)       CRANBERRY EXTRACT PO Take by mouth daily. 1 100mg gummy 2 times a day      Docusate Calcium (STOOL SOFTENER PO) Take  by mouth.      Multiple Vitamin (MULTIVITAMIN OR) Take  by mouth.      Omega-3 Fatty Acids (FISH OIL PO)       Polyethylene Glycol 3350 (MIRALAX PO) Take by mouth as needed      vitamin C (ASCORBIC ACID) 1000 MG TABS Take 1 tablet by mouth daily.         Medications from List 1 of the standing order (on medications that exclude the use of Paxlovid) that patient is taking: NONE. Is patient taking Marcos's Wort? No  Is patient taking Marcos's Wort or any meds from List 1? No.   Medications from List 2 of the standing order (on meds that provider needs to adjust) that patient is taking: NONE. Is patient on any of the meds from List 2? No.   Medications from List 3 of standing order (on meds that a RN needs to adjust) that patient is taking: atorvastatin (Lipitor): Instructed patient to stop atorvastatin while taking Paxlovid and restart atorvastatin 1 day after the completion of Paxlovid.  Is patient on any meds from List 3? Yes. Patient is on meds from list 3. No meds require a provider visit and at least one med required RN to adjust.     Paxlovid has an approximate 90% reduction in hospitalization. Paxlovid can possibly cause altered sense of taste, diarrhea (loose, watery stools), high blood pressure, muscle aches.     Would patient like a Paxlovid prescription?   Yes.   Lab Results   Component Value Date    GFRESTIMATED 78 10/29/2024       Was last eGFR reduced? No, eGFR 60 or greater/ No Result on record. Patient can receive the normal renal function dose. Paxlovid Rx sent to [place]  [facility]   [facility] [phone]  [address], [address]  [place] [address]    Hours:  Mon-Fri: 7:00a - 7:00p    Drive-thru services available.    Temporary change to home medications: atorvastatin (Lipitor): Instructed patient to stop atorvastatin while taking Paxlovid and restart atorvastatin 1 day after the completion of Paxlovid.    All medication adjustments (holds, etc) were discussed with the patient and patient was asked to repeat back (teachback) their med adjustment.  Did patient understand med adjustment? Yes, patient repeated back and understood correctly.    Reviewed the following instructions with the patient:    Paxlovid (nimatrelvir and ritonavir)    How it works  Two medicines (nirmatrelvir and ritonavir) are taken together. They stop the virus from growing. Less amount of virus is easier for your body to fight.    How to take  Medicine comes in a daily container with both medicine tablets. Take by mouth twice daily (once in the morning, once at night) for 5 days.  The number of tablets to take varies by patient.  Don't chew or break capsules. Swallow whole.    When to take  Take as soon as possible after positive COVID-19 test result, and within 5 days of your first symptoms.    Possible side effects  Can cause altered sense of taste, diarrhea (loose, watery stools), high blood pressure, muscle aches.    Niya Pringle RN

## 2024-12-23 ENCOUNTER — ANCILLARY PROCEDURE (OUTPATIENT)
Dept: MAMMOGRAPHY | Facility: CLINIC | Age: 74
End: 2024-12-23
Attending: NURSE PRACTITIONER
Payer: MEDICARE

## 2024-12-23 DIAGNOSIS — Z12.31 VISIT FOR SCREENING MAMMOGRAM: ICD-10-CM

## 2024-12-23 PROCEDURE — 77063 BREAST TOMOSYNTHESIS BI: CPT | Mod: TC | Performed by: RADIOLOGY

## 2024-12-23 PROCEDURE — 77067 SCR MAMMO BI INCL CAD: CPT | Mod: TC | Performed by: RADIOLOGY

## 2025-03-30 ENCOUNTER — HEALTH MAINTENANCE LETTER (OUTPATIENT)
Age: 75
End: 2025-03-30

## 2025-07-24 ENCOUNTER — TRANSFERRED RECORDS (OUTPATIENT)
Dept: FAMILY MEDICINE | Facility: CLINIC | Age: 75
End: 2025-07-24
Payer: MEDICARE